# Patient Record
Sex: FEMALE | Race: WHITE | URBAN - METROPOLITAN AREA
[De-identification: names, ages, dates, MRNs, and addresses within clinical notes are randomized per-mention and may not be internally consistent; named-entity substitution may affect disease eponyms.]

---

## 2019-06-11 ENCOUNTER — HOSPITAL ENCOUNTER (OUTPATIENT)
Age: 29
Setting detail: SPECIMEN
Discharge: HOME | End: 2019-06-11
Payer: COMMERCIAL

## 2019-06-11 ENCOUNTER — OFFICE VISIT (OUTPATIENT)
Dept: OBGYN CLINIC | Age: 29
End: 2019-06-11
Payer: COMMERCIAL

## 2019-06-11 VITALS
DIASTOLIC BLOOD PRESSURE: 84 MMHG | SYSTOLIC BLOOD PRESSURE: 122 MMHG | HEIGHT: 66 IN | WEIGHT: 164.4 LBS | BODY MASS INDEX: 26.42 KG/M2

## 2019-06-11 DIAGNOSIS — Z01.419 ENCOUNTER FOR GYNECOLOGICAL EXAMINATION: Primary | ICD-10-CM

## 2019-06-11 DIAGNOSIS — N92.6 IRREGULAR MENSTRUAL BLEEDING: ICD-10-CM

## 2019-06-11 PROCEDURE — 99385 PREV VISIT NEW AGE 18-39: CPT | Performed by: NURSE PRACTITIONER

## 2019-06-11 RX ORDER — NORETHINDRONE ACETATE AND ETHINYL ESTRADIOL, ETHINYL ESTRADIOL AND FERROUS FUMARATE 1MG-10(24)
KIT ORAL
COMMUNITY
Start: 2019-06-06 | End: 2020-06-25 | Stop reason: DRUGHIGH

## 2019-06-11 ASSESSMENT — ENCOUNTER SYMPTOMS
BACK PAIN: 0
ABDOMINAL DISTENTION: 0
SHORTNESS OF BREATH: 0
DIARRHEA: 0
COUGH: 0
CONSTIPATION: 0
ABDOMINAL PAIN: 0

## 2019-06-11 NOTE — PROGRESS NOTES
HPI:     Amy Kennedy a 30 y.o. female who presents today for:  Chief Complaint   Patient presents with   [address] Annual Exam     last pap 9/14/16-WNL        HPI  Here for annual exam and to establish care. Works as a  at [employer]. No children. Has just started working out and eating healthy. GYNECOLOGICHISTORY:  MenstrualHistory: Patient's last menstrual period was 05/08/2019. Sexually Transmitted Infections: No  History of Ectopic Pregnancy:No  Menarche: 13  Cycles generally monthly, but has been having random spotting for almost 2 years  Dysmenorrhea: minimal  Sexually active:yes  Dyspareunia: none    Current Outpatient Medications   Medication Sig Dispense Refill    LO LOESTRIN FE 1 MG-10 MCG / 10 MCG tablet        No current facility-administered medications for this visit. No Known Allergies    History reviewed. No pertinent past medical history. Denies family history of breast, ovarian, uterus, colon CA     Past Surgical History:   Procedure Laterality Date    TONSILLECTOMY       Family History   Problem Relation Age of Onset    No Known Problems Mother     Heart Disease Father     Colon Cancer Maternal Grandmother     Kidney Disease Maternal Grandmother     Colon Cancer Maternal Grandfather     Breast Cancer Paternal Grandmother     Heart Attack Paternal Grandfather      Social History     Tobacco Use    Smoking status: Former Smoker    Smokeless tobacco: Never Used   Substance Use Topics    Alcohol use: No        Subjective:      Review of Systems   Constitutional: Negative for appetite change and fatigue. HENT: Negative for congestion and hearing loss. Eyes: Negative for visual disturbance. Respiratory: Negative for cough and shortness of breath. Cardiovascular: Negative for chest pain and palpitations. Gastrointestinal: Negative for abdominal distention, abdominal pain, constipation and diarrhea.    Genitourinary: Negative for flank pain, frequency, menstrual problem, pelvic pain and vaginal discharge. Musculoskeletal: Negative for back pain. Neurological: Negative for syncope and headaches. Psychiatric/Behavioral: Negative for behavioral problems. Objective:     Ht 5' 5.5\" (1.664 m)   Wt 164 lb 6.4 oz (74.6 kg)   LMP 05/08/2019   Breastfeeding? No   BMI 26.94 kg/m²   Physical Exam   Constitutional: She is oriented to person, place, and time. She appears well-developed and well-nourished. Eyes: Pupils are equal, round, and reactive to light. Neck: No tracheal deviation present. No thyromegaly present. Cardiovascular: Normal rate, regular rhythm and normal heart sounds. Pulmonary/Chest: Effort normal and breath sounds normal. No respiratory distress. Right breast exhibits no inverted nipple. Left breast exhibits no inverted nipple, no mass, no nipple discharge, no skin change and no tenderness. No breast tenderness, discharge or bleeding. Breasts are symmetrical.   Abdominal: Soft. Bowel sounds are normal. She exhibits no distension and no mass. There is no tenderness. There is no CVA tenderness. Hernia confirmed negative in the right inguinal area and confirmed negative in the left inguinal area. Genitourinary: No breast tenderness, discharge or bleeding. There is no rash or lesion on the right labia. There is no rash or lesion on the left labia. Uterus is not deviated, not enlarged and not fixed. Cervix exhibits no motion tenderness, no discharge and no friability. Right adnexum displays no mass, no tenderness and no fullness. Left adnexum displays no mass, no tenderness and no fullness. No tenderness in the vagina. No vaginal discharge found. Musculoskeletal: She exhibits no edema or tenderness. Lymphadenopathy:     She has no cervical adenopathy. Right: No inguinal adenopathy present. Left: No inguinal adenopathy present. Neurological: She is alert and oriented to person, place, and time.    Skin: Skin is warm and dry.   Psychiatric: She has a normal mood and affect. Her behavior is normal. Judgment and thought content normal.         Assessment:     1. Encounter for gynecological examination    2. Irregular menstrual bleeding          Plan:   1. Pap collected. Discussed new pap smear guidelines. Desires re-pap in 1 year. 2. Breast self exam reviewed  3. Calcium and Vitamin D dosing reviewed. 4. Seat belt use reviewed  5. Family planning reviewed. Birth control ocp.  Change to Necon    Electronicallysigned by Guanakito Orona on 6/11/2019

## 2019-06-14 LAB — CYTOLOGY REPORT: NORMAL

## 2019-06-19 LAB
HPV SAMPLE: ABNORMAL
HPV, GENOTYPE 16: NOT DETECTED
HPV, GENOTYPE 18: NOT DETECTED
HPV, HIGH RISK OTHER: DETECTED
HPV, INTERPRETATION: ABNORMAL
SPECIMEN DESCRIPTION: ABNORMAL

## 2020-05-06 ENCOUNTER — TELEPHONE (OUTPATIENT)
Dept: OBGYN CLINIC | Age: 30
End: 2020-05-06

## 2020-06-25 ENCOUNTER — HOSPITAL ENCOUNTER (OUTPATIENT)
Age: 30
Setting detail: SPECIMEN
Discharge: HOME | End: 2020-06-25
Payer: COMMERCIAL

## 2020-06-25 ENCOUNTER — OFFICE VISIT (OUTPATIENT)
Dept: OBGYN CLINIC | Age: 30
End: 2020-06-25
Payer: COMMERCIAL

## 2020-06-25 VITALS
SYSTOLIC BLOOD PRESSURE: 124 MMHG | DIASTOLIC BLOOD PRESSURE: 72 MMHG | WEIGHT: 172 LBS | HEIGHT: 66 IN | BODY MASS INDEX: 27.64 KG/M2

## 2020-06-25 PROCEDURE — 99395 PREV VISIT EST AGE 18-39: CPT | Performed by: NURSE PRACTITIONER

## 2020-06-25 RX ORDER — NORGESTIMATE AND ETHINYL ESTRADIOL 7DAYSX3 28
1 KIT ORAL DAILY
Qty: 28 TABLET | Refills: 12 | Status: SHIPPED | OUTPATIENT
Start: 2020-06-25 | End: 2021-07-22

## 2020-06-25 ASSESSMENT — ENCOUNTER SYMPTOMS
ABDOMINAL DISTENTION: 0
ABDOMINAL PAIN: 0
CONSTIPATION: 0
SHORTNESS OF BREATH: 0
BACK PAIN: 0
COUGH: 0
DIARRHEA: 0

## 2020-07-01 LAB — CYTOLOGY REPORT: NORMAL

## 2021-07-22 ENCOUNTER — OFFICE VISIT (OUTPATIENT)
Dept: OBGYN CLINIC | Age: 31
End: 2021-07-22
Payer: COMMERCIAL

## 2021-07-22 ENCOUNTER — HOSPITAL ENCOUNTER (OUTPATIENT)
Age: 31
Setting detail: SPECIMEN
Discharge: HOME | End: 2021-07-22
Payer: COMMERCIAL

## 2021-07-22 VITALS
SYSTOLIC BLOOD PRESSURE: 120 MMHG | BODY MASS INDEX: 27.01 KG/M2 | DIASTOLIC BLOOD PRESSURE: 62 MMHG | WEIGHT: 178.2 LBS | HEIGHT: 68 IN

## 2021-07-22 DIAGNOSIS — Z32.01 POSITIVE PREGNANCY TEST: ICD-10-CM

## 2021-07-22 DIAGNOSIS — Z32.01 POSITIVE PREGNANCY TEST: Primary | ICD-10-CM

## 2021-07-22 LAB
ABO/RH: NORMAL
ABSOLUTE EOS #: 0.07 K/UL (ref 0–0.44)
ABSOLUTE IMMATURE GRANULOCYTE: 0.05 K/UL (ref 0–0.3)
ABSOLUTE LYMPH #: 1.95 K/UL (ref 1.1–3.7)
ABSOLUTE MONO #: 0.9 K/UL (ref 0.1–1.2)
AMPHETAMINE SCREEN URINE: NEGATIVE
ANTIBODY SCREEN: NEGATIVE
BARBITURATE SCREEN URINE: NEGATIVE
BASOPHILS # BLD: 0 % (ref 0–2)
BASOPHILS ABSOLUTE: 0.04 K/UL (ref 0–0.2)
BENZODIAZEPINE SCREEN, URINE: NEGATIVE
BILIRUBIN URINE: NEGATIVE
BUPRENORPHINE URINE: NORMAL
C. TRACHOMATIS, EXTERNAL RESULT: NEGATIVE
CANNABINOID SCREEN URINE: NEGATIVE
COCAINE METABOLITE, URINE: NEGATIVE
COLOR: YELLOW
COMMENT UA: NORMAL
CONTROL: PRESENT
DIFFERENTIAL TYPE: ABNORMAL
DIRECT EXAM: ABNORMAL
EOSINOPHILS RELATIVE PERCENT: 1 % (ref 1–4)
GLUCOSE URINE: NEGATIVE
HCT VFR BLD CALC: 40.4 % (ref 36.3–47.1)
HEMOGLOBIN: 12.8 G/DL (ref 11.9–15.1)
HEPATITIS B SURFACE ANTIGEN: NONREACTIVE
HEPATITIS C ANTIBODY, EXTERNAL RESULT: NONREACTIVE
HEPATITIS C ANTIBODY: NONREACTIVE
HIV AG/AB: NONREACTIVE
IMMATURE GRANULOCYTES: 0 %
KETONES, URINE: NEGATIVE
LEUKOCYTE ESTERASE, URINE: NEGATIVE
LYMPHOCYTES # BLD: 16 % (ref 24–43)
Lab: ABNORMAL
MCH RBC QN AUTO: 27.7 PG (ref 25.2–33.5)
MCHC RBC AUTO-ENTMCNC: 31.7 G/DL (ref 28.4–34.8)
MCV RBC AUTO: 87.4 FL (ref 82.6–102.9)
MDMA URINE: NORMAL
METHADONE SCREEN, URINE: NEGATIVE
METHAMPHETAMINE, URINE: NORMAL
MONOCYTES # BLD: 7 % (ref 3–12)
N. GONORRHOEAE, EXTERNAL RESULT: NEGATIVE
NITRITE, URINE: NEGATIVE
NRBC AUTOMATED: 0 PER 100 WBC
OPIATES, URINE: NEGATIVE
OXYCODONE SCREEN URINE: NEGATIVE
PAP SMEAR: NORMAL
PDW BLD-RTO: 13.6 % (ref 11.8–14.4)
PH UA: 7.5 (ref 5–8)
PHENCYCLIDINE, URINE: NEGATIVE
PLATELET # BLD: 248 K/UL (ref 138–453)
PLATELET ESTIMATE: ABNORMAL
PMV BLD AUTO: 12.3 FL (ref 8.1–13.5)
PREGNANCY TEST URINE, POC: POSITIVE
PROPOXYPHENE, URINE: NORMAL
PROTEIN UA: NEGATIVE
RBC # BLD: 4.62 M/UL (ref 3.95–5.11)
RBC # BLD: ABNORMAL 10*6/UL
RUBV IGG SER QL: 99.3 IU/ML
SEG NEUTROPHILS: 76 % (ref 36–65)
SEGMENTED NEUTROPHILS ABSOLUTE COUNT: 9.36 K/UL (ref 1.5–8.1)
SPECIFIC GRAVITY UA: 1.02 (ref 1–1.03)
SPECIMEN DESCRIPTION: ABNORMAL
T. PALLIDUM, IGG: NONREACTIVE
TEST INFORMATION: NORMAL
TRICYCLIC ANTIDEPRESSANTS, UR: NORMAL
TURBIDITY: CLEAR
URINE HGB: NEGATIVE
UROBILINOGEN, URINE: NORMAL
WBC # BLD: 12.4 K/UL (ref 3.5–11.3)
WBC # BLD: ABNORMAL 10*3/UL

## 2021-07-22 PROCEDURE — 99213 OFFICE O/P EST LOW 20 MIN: CPT | Performed by: NURSE PRACTITIONER

## 2021-07-22 PROCEDURE — 81025 URINE PREGNANCY TEST: CPT | Performed by: NURSE PRACTITIONER

## 2021-07-22 ASSESSMENT — ENCOUNTER SYMPTOMS
DIARRHEA: 0
ABDOMINAL PAIN: 0
COUGH: 0
ABDOMINAL DISTENTION: 0
BACK PAIN: 0
SHORTNESS OF BREATH: 0
CONSTIPATION: 0

## 2021-07-22 NOTE — PATIENT INSTRUCTIONS
translucency test. This test uses ultrasound to measure the thickness of the area at the back of the baby's neck. An increase in the thickness can be an early sign of Down syndrome. ? Chorionic villus sampling (CVS). This is a test that looks for certain genetic problems with your baby. The same genes that are in your baby are in the placenta. A small piece of the placenta is taken out and tested. This test is done when you are 10 to 13 weeks pregnant. Ease discomfort  · Slow down and take naps when you feel tired. · If your emotions swing, talk to someone. · If your gums bleed, try a softer toothbrush. If your gums are puffy and bleed a lot, see your dentist.  · If you feel dizzy:  ? Get up slowly after sitting or lying down. ? Drink plenty of fluids. ? Eat small snacks to keep your blood sugar stable. ? Put your head between your legs as though you were tying your shoelaces. ? Lie down with your legs higher than your head. Use pillows to prop up your feet. · If you have a headache:  ? Lie down. ? Ask your partner or a good friend for a neck massage. ? Try cool cloths over your forehead or across the back of your neck. ? Use acetaminophen (Tylenol) for pain relief. Do not use nonsteroidal anti-inflammatory drugs (NSAIDs), such as ibuprofen (Advil, Motrin) or naproxen (Aleve), unless your doctor says it is okay. · If you have a nosebleed, pinch your nose gently, and hold it for a short while. To prevent nosebleeds, try massaging a small dab of petroleum jelly, such as Vaseline, in your nostrils. · If your nose is stuffed up, try saline (saltwater) nose sprays. Do not use decongestant sprays. Care for your breasts  · Wear a bra that gives you good support. · Know that changes in your breasts are normal.  ? Your breasts may get larger and more tender. Tenderness usually gets better by 12 weeks. ? Your nipples may get darker and larger, and small bumps around your nipples may show more. ?  The veins in your chest and breasts may show more. Where can you learn more? Go to [email or web address] and sign in to your mydala account. Enter P839 in the [facility] box to learn more about \"Weeks 10 to 14 of Your Pregnancy: Care Instructions. \"     If you do not have an account, please click on the \"Sign Up Now\" link. Current as of: October 8, 2020               Content Version: 12.9  © 2006-2021 Healthwise, Incorporated. Care instructions adapted under license by [facility] ([facility]). If you have questions about a medical condition or this instruction, always ask your healthcare professional. [address] any warranty or liability for your use of this information.

## 2021-07-22 NOTE — PROGRESS NOTES
Doc Canseco is a 28 y.o. Giovanni Cruel at 9w1d with Estimated Date of Delivery: 22 who presents for prenatal care. This is a planned pregnancy : not preventing  Patient's last menstrual period was 2021. Certain LMP : yes      Pregnancy symptoms include fatigue, breast tenderness, nausea, \"morning sickness\", positive home pregnancy test and frequent urination  nausea without vomiting for 14 days  full time job doing vet tech/biomedical research  Pain Score 0  /10  Partner's name -Stacia Graham  Relationship with FOB: spouse, living together. Patientdoes intend to breast feed. Pregnancy history fully reviewed. Mother's ethnicity:   Father's ethnicity:          POB:   OB History    Para Term  AB Living   1 0 0 0 0 0   SAB TAB Ectopic Molar Multiple Live Births   0 0 0 0 0 0      # Outcome Date GA Lbr Kd/2nd Weight Sex Delivery Anes PTL Lv   1 Current              Complications from previous pregnancies/deliveries    PGYN: denies STDs; denies abnormal pap smears                      Menses regular yes  Contraception none    PMH:  has a past medical history of Atypical squamous cells of undetermined significance (ASCUS) on Papanicolaou smear of cervix (2019). PSH:  has a past surgical history that includes Tonsillectomy. FH:family history includes Breast Cancer in her paternal grandmother; Colon Cancer in her maternal grandfather and maternal grandmother; Heart Attack in her paternal grandfather; Heart Disease in her father; Kidney Disease in her maternal grandmother; No Known Problems in her mother. SH: denies X 3, family supportive  reports that she has quit smoking. She has never used smokeless tobacco. She reports that she does not drink alcohol and does not use drugs. Review of Systems   Constitutional: Negative for appetite change and fatigue. HENT: Negative for congestion and hearing loss. Eyes: Negative for visual disturbance.    Respiratory: Negative for cough and shortness of breath. Cardiovascular: Negative for chest pain and palpitations. Gastrointestinal: Negative for abdominal distention, abdominal pain, constipation and diarrhea. Genitourinary: Negative for flank pain, frequency, menstrual problem, pelvic pain and vaginal discharge. Musculoskeletal: Negative for back pain. Neurological: Negative for syncope and headaches. Psychiatric/Behavioral: Negative for behavioral problems. Physical exam:/62 (Site: Right Upper Arm, Position: Sitting, Cuff Size: Medium Adult)   Ht 5' 7.75\" (1.721 m)   Wt 178 lb 3.2 oz (80.8 kg)   LMP 2021   Breastfeeding No   BMI 27.30 kg/m²   General Appearance: alert and oriented to person,place and time, well developed and well- nourished, in no acute distress  Skin: warm and dry, no rash or erythema  Head: normocephalic and atraumatic  Eyes: extraocular eye movements intact, conjunctivae normal  ENT:  external ear and ear canal normal bilaterally, nose without deformity, nasal mucosa normal   Neck: supple and non-tender without mass,   Pulmonary/Chest: pulmonary effort wnl  Abdomen: soft, non-tender, non-distended, no masses or organomegaly  Extremities: no cyanosis, clubbing or edema  Musculoskeletal: normal rangeof motion, no joint swelling, deformity or tenderness  Neurologic: gait, coordination and speech normal  Breast: without skin retraction, dimpling, puckering, nipple discharge or masses. There is no axillary adenopathy      Pelvic: external genitalia WNL's, no rashes, no lesions. Speculum exam: vaginal vault pink, wellrugated, without lesions. No discharge. Cervix without lesions. Bimanual exam: no cervical motion tenderness. Impression: @ 9w1d by LMP             There is no problem list on file for this patient. Plan:     The patient was seen full history and physical was completed/reviewed.     - Prenatal labs ordered   - Prenatal vitamins prescription Given   - Problem list reviewed and updated   - NT Screen/Quad Testing and MSAFP Single Marker: requested, lab undecided   - Role of ultrasound in pregnancy discussed; requests fetal survey, MFM referral not indicated   - Gc/Chlam Cultures & Wet Prep Collected, results ordered   - Pap Smear done  Counseling Completed: The patient was counseled on office policies and she was counseled on termination of pregnancy in the state of [place].  labor precautions were reviewed and she is to contact the office if she experiences vaginal bleeding, leakage of fluid or abdominal pain. The patient was counseled on Toxoplasmosis, HIV, Tobacco Abuse, Group Beta Strep Infections, Cystic Fibrosis,  Labor precautions and Sickle Cell disease. Her medication list was reviewed along with the need to clarify if new medications prescribed or used are okay in pregnancy before taking them. The patient was counseled on the risks of tobacco abuse. Both maternal and fetal. She was instructed to stop smoking if currently using tobacco. Morbidity, mortality, and cessation programs were reviewed. The risks include but are not limited to increased risks of  labor,  delivery, premature rupture of membranes, intrauterine growth restriction, intrauterine fetal demise and abruptio placenta. Secondary smoke risks were also reviewed. Increases in cancer, respiratory problems, and sudden infant death syndrome were reviewed as well. The patient was informed of a 2-4% risk of congenital anomalies in the general population. She was also informed that karyotyping is the only way to evaluate the fetus for genetic problems and genetic lethal anomalies. Chorionic villous sampling, amniocentesis and VeriFi were also discussed with morbidity rates in detail. She undecided the procedure.      Route of delivery and counseling on vaginal, operative vaginal, and  sections were completed with the risks of each to both the patient as well as her baby. The possibility of a blood transfusion was discussed as well. The patient was not opposed to receiving a transfusion if needed. Nuchal translucency/Quad Evaluation and MSAFP single marker testing was reviewed in detail with attention to timing of testing and their windows. For patients beyond the gestational age for Nuchal translucency evaluation Quad testing was recommended. Timing for the Quad test was reviewed. Benefits of the above testing was reviewed. A second trimester amniocentesis was also made available to the patient. Risks, Benefits and non-invasive alternative testing was reviewed. T-dap Vaccine recommendations reviewed with the patient. Patient notified of timing of vaccination 27-36 weeks gestation. Patient aware Vaccine is not Live. - Precautions given for ectopic pregnancy including increased abdominal pain, vaginal bleeding. Patient knows to go to ED should these symptoms occur.       Orders Placed This Encounter   Procedures    C.trachomatis N.gonorrhoeae DNA    Culture, Urine    VAGINITIS DNA PROBE    US OB LESS THAN 14 WEEKS SINGLE OR FIRST GESTATION    US OB TRANSVAGINAL    CBC Auto Differential    Cystic fibrosis gene test    Hepatitis B Surface Antigen Obstetric Panel    Hepatitis C Antibody    HIV Screen    PAP SMEAR    Rubella antibody, IgG    T. pallidum Ab    Urinalysis    Urine Drug Screen    POCT urine pregnancy    Prenatal type and screen

## 2021-07-23 LAB
C TRACH DNA GENITAL QL NAA+PROBE: NEGATIVE
CULTURE: NO GROWTH
Lab: NORMAL
N. GONORRHOEAE DNA: NEGATIVE
SPECIMEN DESCRIPTION: NORMAL
SPECIMEN DESCRIPTION: NORMAL

## 2021-07-29 LAB — CYSTIC FIBROSIS: NORMAL

## 2021-08-02 DIAGNOSIS — Z32.01 POSITIVE PREGNANCY TEST: ICD-10-CM

## 2021-08-17 ENCOUNTER — INITIAL PRENATAL (OUTPATIENT)
Dept: OBGYN CLINIC | Age: 31
End: 2021-08-17

## 2021-08-17 VITALS
BODY MASS INDEX: 29.13 KG/M2 | WEIGHT: 185.6 LBS | HEIGHT: 67 IN | DIASTOLIC BLOOD PRESSURE: 62 MMHG | SYSTOLIC BLOOD PRESSURE: 122 MMHG

## 2021-08-17 DIAGNOSIS — Z82.0 FH: MULTIPLE SCLEROSIS: ICD-10-CM

## 2021-08-17 DIAGNOSIS — Z3A.12 12 WEEKS GESTATION OF PREGNANCY: ICD-10-CM

## 2021-08-17 DIAGNOSIS — Z34.91 NORMAL PREGNANCY IN FIRST TRIMESTER: ICD-10-CM

## 2021-08-17 DIAGNOSIS — Z82.79 FH: SPINA BIFIDA: Primary | ICD-10-CM

## 2021-08-17 PROCEDURE — 0502F SUBSEQUENT PRENATAL CARE: CPT | Performed by: NURSE PRACTITIONER

## 2021-08-17 NOTE — PATIENT INSTRUCTIONS
Patient Education        Weeks 10 to 14 of Your Pregnancy: Care Instructions  Overview     By weeks 10 to 15 of your pregnancy, the placenta has formed inside your uterus. The placenta's main job is to give your baby oxygen and nutrients through the umbilical cord. It's possible to hear your baby's heartbeat with a special ultrasound device. Your baby's organs are developing. The arms and legs can bend. This is a good time to think about testing for birth defects. There are two types of tests: screening and diagnostic. Screening tests show the chance that a baby has a certain birth defect. They can't tell you for sure that your baby has a problem. Diagnostic tests show if a baby has a certain birth defect. It's your choice whether to have these tests. You and your partner can talk to your doctor or midwife about tests for birth defects. Follow-up care is a key part of your treatment and safety. Be sure to make and go to all appointments, and call your doctor if you are having problems. It's also a good idea to know your test results and keep a list of the medicines you take. How can you care for yourself at home? Decide about tests  · You can have screening tests and diagnostic tests to check for birth defects. The decision to have a test for birth defects is personal. Think about your age, your chance of passing on a family disease, your need to know about any problems, and what you might do after you have the test results. ? Quadruple (quad) blood test. This screening test can be done between 15 and 22 weeks of pregnancy. It checks the amount of four substances in your blood. The doctor looks at these test results, along with your age and other factors, to find out the chance that your baby may have certain problems. ? Amniocentesis. This diagnostic test is used to look for chromosomal problems in the baby's cells.  It can be done between 15 and 20 weeks of pregnancy, usually around week 16.  ? Nuchal translucency test. This test uses ultrasound to measure the thickness of the area at the back of the baby's neck. An increase in the thickness can be an early sign of Down syndrome. ? Chorionic villus sampling (CVS). This is a test that looks for certain genetic problems with your baby. The same genes that are in your baby are in the placenta. A small piece of the placenta is taken out and tested. This test is done when you are 10 to 13 weeks pregnant. Ease discomfort  · Slow down and take naps when you feel tired. · If your emotions swing, talk to someone. · If your gums bleed, try a softer toothbrush. If your gums are puffy and bleed a lot, see your dentist.  · If you feel dizzy:  ? Get up slowly after sitting or lying down. ? Drink plenty of fluids. ? Eat small snacks to keep your blood sugar stable. ? Put your head between your legs as though you were tying your shoelaces. ? Lie down with your legs higher than your head. Use pillows to prop up your feet. · If you have a headache:  ? Lie down. ? Ask your partner or a good friend for a neck massage. ? Try cool cloths over your forehead or across the back of your neck. ? Use acetaminophen (Tylenol) for pain relief. Do not use nonsteroidal anti-inflammatory drugs (NSAIDs), such as ibuprofen (Advil, Motrin) or naproxen (Aleve), unless your doctor says it is okay. · If you have a nosebleed, pinch your nose gently, and hold it for a short while. To prevent nosebleeds, try massaging a small dab of petroleum jelly, such as Vaseline, in your nostrils. · If your nose is stuffed up, try saline (saltwater) nose sprays. Do not use decongestant sprays. Care for your breasts  · Wear a bra that gives you good support. · Know that changes in your breasts are normal.  ? Your breasts may get larger and more tender. Tenderness usually gets better by 12 weeks. ? Your nipples may get darker and larger, and small bumps around your nipples may show more. ?  The veins in your chest and breasts may show more. Where can you learn more? Go to [email or web address] and sign in to your Rarelook account. Enter W043 in the [facility] box to learn more about \"Weeks 10 to 14 of Your Pregnancy: Care Instructions. \"     If you do not have an account, please click on the \"Sign Up Now\" link. Current as of: October 8, 2020               Content Version: 12.9  © 0432-9777 Healthwise, Incorporated. Care instructions adapted under license by [facility] If you have questions about a medical condition or this instruction, always ask your healthcare professional. [address] any warranty or liability for your use of this information.

## 2021-08-17 NOTE — PROGRESS NOTES
Relationship with FOB: Spouse, living together, other children 10 y/o male healthy  Partner's name:Jordan  Plans to Breast   Pain Score:0/10  Job title: research  This is a planned pregnancy:Yes  Certain LMP:Yes  S/S of pregnancy:Yes: UPT+, breast tenderness, nausea intermittent  Hx N/V pregnancy:no emesis     Mother's ethnicity: Cauc  Father's ethnicity: [facility]       There is no problem list on file for this patient. Last menstrual period 05/19/2021, not currently breastfeeding. Gauri Larson is a 28 y.o. Rafy Lazier, here for her ACOG. The patients past medical, surgical, social and family history were reviewed. Current medications and allergies were reviewed, and documented in the chart. Menstrual history: Irreg cycles  Birth control: BCP, stopped in Feb/March    Wt Readings from Last 3 Encounters:   07/22/21 178 lb 3.2 oz (80.8 kg)   06/25/20 172 lb (78 kg)   06/11/19 164 lb 6.4 oz (74.6 kg)     Recent Results (from the past 8736 hour(s))   Cystic Fibrosis    Collection Time: 07/22/21 12:00 AM   Result Value Ref Range    Cystic Fibrosis       Specimen(s) Received:  Peripheral blood, CF  Clinical Information:  Prenatal screening for CF  Unknown family history of CF      RESULTS:  ** This patient is negative for all CF mutations analyzed **  Due to the complex nature of this testing, genetic counseling is  recommended  This interpretation is based on the assumption that this individual  has a negative personal and family history for cystic fibrosis    INTERPRETATION:    Using the methods described, this patient tested  negative for all 60 mutations screened, including those recommended by  the [employer]necologists and the  [facility].   These results do not rule out  the possibility that this individual could carry a CF mutation not  detected by this test.  The patient should understand that DNA studies  do not constitute a definitive carrier test for cystic fibrosis in all  individuals. Thus, interpretation is given as a probability (see  below). Accurate risk calcula tion requires accurate family history  information. Inaccurate reporting of a family history of cystic  fibrosis will lead to errors in residual risk assessment. It should  be realized that there are many sources of diagnostic error in  molecular testing which may include trace contamination of PCR  reactions, rare genetic variants that can interfere with the analysis,  or general laboratory error. Cystic Fibrosis Carrier Rate by Racial and Ethnic Group, Before and  After Screening  [from Delta et al (2001):  Debra in Med 3(2), 149-154]                                  Estimated Carrier Risk          Ethnic Group               Detection Rate                     Before Test          After Negative Test       Ashkenazi Shinto                    97%               1/29 1/930                       90%               1/29 1/240                      69%               1/65 1/207       * American                 57%               1/46 1/105   American                    No data available          1/90           No data available     *Additional information required to accurately predict risk  Note:  Residual carrier risk after negative test is modified by  presence of positive family history of CF or by admixture of ethnic  groups. For these situations, accurate risk assessment requires Bayesian  analysis and genetic counseling    This molecular test has been approved for in vitro diagnostic use by  the U.S. FDA. This test is used for clinical purposes. Pursuant to  the requirements of CLIA '88, this laboratory has established and  verified the test's accuracy and precision. It should not be regarded  as investigational or for research.   This laboratory is certified  under the Clinical Laboratory Improvement Amendments of 1988 (CLIA  '88) as qualified to perform high complexity clinical laboratory  testing. Methodology:  Samples are tested for the presence of  wild type or  mutant gene sequences in the CFTR gene by the [employer] Cystic  Fibrosis 60 Kit. It is comprised of a single multiplex PCR reaction  that is then used in two separate Allele Specific Primer Extension  reactions, which are followed by two separate bead hybridization  reactions. The [employer] Assay screens for 60 CFTR mutations,  including the standard 23 mutation core panel recommended by the  [employer] of [employer]stetricians and Gynecologists (ACOG) and the  [address] [place] [address] ([facility]):  , , G542X,  G85E, R117H, 621+1G>T, 711+1G>T, V5140D, R334W, R347P, A455E,  1717-1G>A, R560T, R553X, G551D, 1898+1G>A, 2184delA, 2789+5G>A,  3120+1G>A, P2322R, 3659delC, 3849+10kbC>T, D9392R, 1078delT, 394delTT,  Y122X, R347H, V520F, A559T, S549N, S549R, 1898+5G>T, 2183AA>G,  2307insA, E2311X, A6258U, G3816B, 3876delA, 3905insT, CFTRdele2,3,  E60X, R75X, 406-1G>A, G178R, [id number], L206W, 935delA, G330X, Q493X,  Q890X, 1677delTA, 2253kiu8>A, 2143delT, K710X,  T8719N, 0093cch7,  F0173W, [id number], J1978R, 3791delC and variants 5T/7T/9T, F508C, I507V,  I506V. For samples positive for R117H, the IVS-8 Poly T variant is  analyzed. **Electronically Signed Out**          RYAN Rascon [address],  [address] [place], [address]   Tel [phone]  Via [employer] for Jose Rangel MD,   Carolina Bhagat. [place]  MD     PAP SMEAR    Collection Time: 07/22/21 12:00 AM   Result Value Ref Range    Pap Negative for intraephithelial lesion or malignancy Negative for intraephithelial lesion or malignancy, Other   C.  Trachomatis, External Result    Collection Time: 07/22/21 12:00 AM   Result Value Ref Range    C. Trachomatis, External Result NEGATIVE    N. Gonorrhoeae, External Result    Collection Time: 07/22/21 12:00 AM   Result Value Ref Range    N. Gonorrhoeae, External Result NEGATIVE    Hepatitis C Antibody, External Result    Collection Time: 07/22/21 12:00 AM   Result Value Ref Range    Hepatitis C Antibody, External Result NONREACTIVE    PRENATAL TYPE AND SCREEN    Collection Time: 07/22/21  8:40 AM   Result Value Ref Range    ABO/Rh A POSITIVE     Antibody Screen NEGATIVE    Rubella antibody, IgG    Collection Time: 07/22/21  8:40 AM   Result Value Ref Range    Rubella Antibody, IGG 99.3 IU/mL   T. pallidum Ab    Collection Time: 07/22/21  8:40 AM   Result Value Ref Range    T. pallidum, IgG NONREACTIVE NONREACTIVE   Culture, Urine    Collection Time: 07/22/21  8:40 AM    Specimen: Urine, clean catch   Result Value Ref Range    Specimen Description . CLEAN CATCH URINE     Special Requests NOT REPORTED     Culture NO GROWTH    Urinalysis    Collection Time: 07/22/21  8:40 AM   Result Value Ref Range    Color, UA YELLOW YELLOW    Turbidity UA CLEAR CLEAR    Glucose, Ur NEGATIVE NEGATIVE    Bilirubin Urine NEGATIVE NEGATIVE    Ketones, Urine NEGATIVE NEGATIVE    Specific Gravity, UA 1.020 1.005 - 1.030    Urine Hgb NEGATIVE NEGATIVE    pH, UA 7.5 5.0 - 8.0    Protein, UA NEGATIVE NEGATIVE    Urobilinogen, Urine Normal Normal    Nitrite, Urine NEGATIVE NEGATIVE    Leukocyte Esterase, Urine NEGATIVE NEGATIVE    Urinalysis Comments       Microscopic exam not performed based on chemical results unless requested in original order.    Urine Drug Screen    Collection Time: 07/22/21  8:40 AM   Result Value Ref Range    Amphetamine Screen, Ur NEGATIVE NEGATIVE    Barbiturate Screen, Ur NEGATIVE NEGATIVE    Benzodiazepine Screen, Urine NEGATIVE NEGATIVE    Cocaine Metabolite, Urine NEGATIVE NEGATIVE    Methadone Screen, Urine NEGATIVE NEGATIVE    Opiates, Urine NEGATIVE NEGATIVE Phencyclidine, Urine NEGATIVE NEGATIVE    Propoxyphene, Urine NOT REPORTED NEGATIVE    Cannabinoid Scrn, Ur NEGATIVE NEGATIVE    Oxycodone Screen, Ur NEGATIVE NEGATIVE    Methamphetamine, Urine NOT REPORTED NEGATIVE    Tricyclic Antidepressants, Urine NOT REPORTED NEGATIVE    MDMA, Urine NOT REPORTED NEGATIVE    Buprenorphine Urine NOT REPORTED NEGATIVE    Test Information       Assay provides medical screening only. The absence of expected drug(s) and/or metabolite(s) may indicate diluted or adulterated urine, limitations of testing or timing of collection.    CBC Auto Differential    Collection Time: 07/22/21  8:40 AM   Result Value Ref Range    WBC 12.4 (H) 3.5 - 11.3 k/uL    RBC 4.62 3.95 - 5.11 m/uL    Hemoglobin 12.8 11.9 - 15.1 g/dL    Hematocrit 40.4 36.3 - 47.1 %    MCV 87.4 82.6 - 102.9 fL    MCH 27.7 25.2 - 33.5 pg    MCHC 31.7 28.4 - 34.8 g/dL    RDW 13.6 11.8 - 14.4 %    Platelets 150 303 - 515 k/uL    MPV 12.3 8.1 - 13.5 fL    NRBC Automated 0.0 0.0 per 100 WBC    Differential Type NOT REPORTED     Seg Neutrophils 76 (H) 36 - 65 %    Lymphocytes 16 (L) 24 - 43 %    Monocytes 7 3 - 12 %    Eosinophils % 1 1 - 4 %    Basophils 0 0 - 2 %    Immature Granulocytes 0 0 %    Segs Absolute 9.36 (H) 1.50 - 8.10 k/uL    Absolute Lymph # 1.95 1.10 - 3.70 k/uL    Absolute Mono # 0.90 0.10 - 1.20 k/uL    Absolute Eos # 0.07 0.00 - 0.44 k/uL    Basophils Absolute 0.04 0.00 - 0.20 k/uL    Absolute Immature Granulocyte 0.05 0.00 - 0.30 k/uL    WBC Morphology NOT REPORTED     RBC Morphology NOT REPORTED     Platelet Estimate NOT REPORTED    Hepatitis B Surface Antigen Obstetric Panel    Collection Time: 07/22/21  8:40 AM   Result Value Ref Range    Hepatitis B Surface Ag NONREACTIVE NONREACTIVE   Hepatitis C Antibody    Collection Time: 07/22/21  8:40 AM   Result Value Ref Range    Hepatitis C Ab NONREACTIVE NONREACTIVE   HIV Screen    Collection Time: 07/22/21  8:40 AM   Result Value Ref Range    HIV Ag/Ab NONREACTIVE NONREACTIVE   POCT urine pregnancy    Collection Time: 07/22/21  9:48 AM   Result Value Ref Range    Preg Test, Ur POSITIVE     Control present    C.trachomatis N.gonorrhoeae DNA    Collection Time: 07/22/21  6:32 PM    Specimen: Cervix   Result Value Ref Range    Specimen Description . CERVIX     C. trachomatis DNA NEGATIVE NEGATIVE    N. gonorrhoeae DNA NEGATIVE NEGATIVE   VAGINITIS DNA PROBE    Collection Time: 07/22/21  6:33 PM    Specimen: Vaginal   Result Value Ref Range    Specimen Description . VAGINA     Special Requests NOT REPORTED     Direct Exam POSITIVE for Candida sp. (A)     Direct Exam NEGATIVE for Gardnerella vaginalis     Direct Exam NEGATIVE for Trichomonas vaginalis     Direct Exam       Method of testing is a DNA probe intended for detection and identification of Candida species, Gardnerella vaginalis, and Trichomonas vaginalis nucleic acid in vaginal fluid specimens from patients with symptoms of vaginitis/vaginosis. Past Medical History:   Diagnosis Date    Atypical squamous cells of undetermined significance (ASCUS) on Papanicolaou smear of cervix 06/11/2019    HPV+                                                                    Past Surgical History:   Procedure Laterality Date    TONSILLECTOMY       Family History   Problem Relation Age of Onset    No Known Problems Mother     Heart Disease Father     Colon Cancer Maternal Grandmother     Kidney Disease Maternal Grandmother     Colon Cancer Maternal Grandfather     Breast Cancer Paternal Grandmother     Heart Attack Paternal Grandfather      Social History     Tobacco Use   Smoking Status Former Smoker   Smokeless Tobacco Never Used     Social History     Substance and Sexual Activity   Alcohol Use No       MEDICATIONS:  Current Outpatient Medications   Medication Sig Dispense Refill    terconazole (TERAZOL 7) 0.4 % vaginal cream One applicator intravaginally at bedtime for 7 days.  1 Tube 0    Prenatal Vit-Fe Fumarate-FA (PRENATAL PO) Take by mouth       No current facility-administered medications for this visit. ALLERGIES:  Patient has no known allergies. Reviewed global and practice OB care including nausea measures, nutrition, activities, warning signs, and contact information. Offered cell free DNA screen, NT echo and AFP .    `--------------------------------------------------------------------------  Genetic Screening/Teratology Counseling  (Include patient, FOB or anyone in either family)    1) Patient's age 28 years or > at [place]: No  2) Thalassemia (Mediterranean, ): No  3) Neural Tube Defect:   Yes, MGF Spina bifida  4) Congenital heart defect:   No  5) Trisomy (e.g. Down Syndrome):  No  6) Louie-sachs [facility]azidi[facility] No  7) Multiple Births:    Yes Pt PGF twin Fraternal  8) Sickle cell (disease or trait):  No  9) Hemophilia or blood disorders:  No  10) Muscular Dystrophy:   No  11) Cystic Fibrosis:    No  12) [place]'s chorea:   No  13) Mental retardation/Autism :  No   If yes, was person tested for fragile X: No  14) Other inherited genetic/chromosomal disorder: Pt, M. Aunt has Multi Sclerosis  15) Maternal metabolic disorder (DM, PKU): No  12) Child with birth defect not listed:  No  16) Recurrent pregnancy loss/stillbirth: No  18) Medications, supplements/illicit or   Recreational drugs/alcohol since LMP: No   List: none  19) Any other:   none    Comments/Counseling: Pt scheduled for NT screening at [address] on 08/19/21.   -------------------------------------------------------------------------  Infection History:    1) Live with someone with TB/exposed to TB: No  2) Patient/partner has h/o genital herpes: No  3) Rash/viral illness since LMP:  No  4) History of STD:    Yes Dx HPV other high risk  5) Other: Now  -------------------------------------------------------------------------

## 2021-08-19 ENCOUNTER — ROUTINE PRENATAL (OUTPATIENT)
Dept: PERINATAL CARE | Age: 31
End: 2021-08-19
Payer: COMMERCIAL

## 2021-08-19 VITALS
DIASTOLIC BLOOD PRESSURE: 76 MMHG | HEIGHT: 67 IN | SYSTOLIC BLOOD PRESSURE: 130 MMHG | WEIGHT: 184 LBS | HEART RATE: 82 BPM | BODY MASS INDEX: 28.88 KG/M2 | RESPIRATION RATE: 16 BRPM | TEMPERATURE: 97.3 F

## 2021-08-19 DIAGNOSIS — O46.8X1 SUBCHORIONIC HEMATOMA IN FIRST TRIMESTER, SINGLE OR UNSPECIFIED FETUS: ICD-10-CM

## 2021-08-19 DIAGNOSIS — Z03.72 SUSPECTED PLACENTAL PROBLEM NOT FOUND: ICD-10-CM

## 2021-08-19 DIAGNOSIS — Z3A.13 13 WEEKS GESTATION OF PREGNANCY: ICD-10-CM

## 2021-08-19 DIAGNOSIS — O41.8X10 SUBCHORIONIC HEMATOMA IN FIRST TRIMESTER, SINGLE OR UNSPECIFIED FETUS: ICD-10-CM

## 2021-08-19 DIAGNOSIS — Z36.9 FIRST TRIMESTER SCREENING: Primary | ICD-10-CM

## 2021-08-19 LAB
CRL: NORMAL
SAC DIAMETER: NORMAL

## 2021-08-19 PROCEDURE — 76801 OB US < 14 WKS SINGLE FETUS: CPT | Performed by: OBSTETRICS & GYNECOLOGY

## 2021-08-19 PROCEDURE — 76813 OB US NUCHAL MEAS 1 GEST: CPT | Performed by: OBSTETRICS & GYNECOLOGY

## 2021-08-20 ENCOUNTER — TELEPHONE (OUTPATIENT)
Dept: OBGYN CLINIC | Age: 31
End: 2021-08-20

## 2021-09-15 NOTE — PATIENT INSTRUCTIONS
Patient Education        Weeks 18 to 22 of Your Pregnancy: Care Instructions  Overview     Your baby is continuing to develop quickly. Sometime between 18 and 22 weeks, you'll probably start to feel your baby move. At first, these small fetal movements feel like fluttering or \"butterflies. \" Some women say that they feel like gas bubbles. As your baby grows, these movements will become stronger. You may also notice that your baby hiccups. Babies at this stage can now suck their thumbs. You may find that your nausea and fatigue are gone. You may feel better overall and have more energy than you did in your first trimester. But you might now also have some new discomforts, like sleep problems or leg cramps. Talk to your doctor about things you can do at home to ease these problems. Follow-up care is a key part of your treatment and safety. Be sure to make and go to all appointments, and call your doctor if you are having problems. It's also a good idea to know your test results and keep a list of the medicines you take. How can you care for yourself at home? Ease sleep problems  · Avoid caffeine in drinks or chocolate late in the day. · Get some exercise every day. · Take a warm shower or bath before bed. · Have a light snack or glass of milk at bedtime. · Do relaxation exercises in bed to calm your mind and body. · Support your legs and back with extra pillows. Try a pillow between your legs if you sleep on your side. · Do not use sleeping pills or alcohol. They could harm your baby. Ease leg cramps  · Do not massage your calf during the cramp. · Sit on a firm bed or chair. Straighten your leg, and bend your foot (flex your ankle) slowly upward, toward your knee. Bend your toes up and down. · Stand on a cool, flat surface. Stretch your toes upward, and take small steps walking on your heels. · Use a heating pad or hot water bottle to help with muscle ache.   Prevent leg cramps  · Be sure to get enough calcium. If you are worried that you are not getting enough, talk to your doctor. · Exercise every day, and stretch your legs before bed. · Take a warm bath before bed, and try leg warmers at night. Where can you learn more? Go to https://chjulia.healthTinyTap. org and sign in to your [employer] account. Enter K820 in the [employer] box to learn more about \"Weeks 18 to 22 of Your Pregnancy: Care Instructions. \"     If you do not have an account, please click on the \"Sign Up Now\" link. Current as of: October 8, 2020               Content Version: 12.9  © 0262-1389 Healthwise, [employer]. Care instructions adapted under license by [facility] [address] If you have questions about a medical condition or this instruction, always ask your healthcare professional. [address] any warranty or liability for your use of this information.

## 2021-09-16 ENCOUNTER — ROUTINE PRENATAL (OUTPATIENT)
Dept: OBGYN CLINIC | Age: 31
End: 2021-09-16

## 2021-09-16 ENCOUNTER — HOSPITAL ENCOUNTER (OUTPATIENT)
Age: 31
Setting detail: SPECIMEN
Discharge: HOME | End: 2021-09-16
Payer: COMMERCIAL

## 2021-09-16 VITALS — WEIGHT: 189 LBS | BODY MASS INDEX: 29.6 KG/M2 | SYSTOLIC BLOOD PRESSURE: 122 MMHG | DIASTOLIC BLOOD PRESSURE: 84 MMHG

## 2021-09-16 DIAGNOSIS — Z82.0 FH: MULTIPLE SCLEROSIS: ICD-10-CM

## 2021-09-16 DIAGNOSIS — Z3A.17 17 WEEKS GESTATION OF PREGNANCY: Primary | ICD-10-CM

## 2021-09-16 DIAGNOSIS — Z34.02 SUPERVISION OF NORMAL FIRST PREGNANCY IN SECOND TRIMESTER: ICD-10-CM

## 2021-09-16 DIAGNOSIS — Z82.79 FH: SPINA BIFIDA: ICD-10-CM

## 2021-09-16 DIAGNOSIS — Z3A.17 17 WEEKS GESTATION OF PREGNANCY: ICD-10-CM

## 2021-09-16 PROCEDURE — 0502F SUBSEQUENT PRENATAL CARE: CPT | Performed by: NURSE PRACTITIONER

## 2021-09-16 NOTE — PROGRESS NOTES
-FM yet, -Ctx, -LOF, -VB  Patient Active Problem List   Diagnosis    FH: spina bifida     Blood pressure 122/84, weight 189 lb (85.7 kg), last menstrual period 05/19/2021, not currently breastfeeding.   Feeling well  Has anatomy scheduled at [facility]  Requesting AFP  Declines flu  Pt following all Covid-19 recommendations

## 2021-09-21 LAB
AFP INTERPRETATION: NORMAL
AFP MOM: 0.7
AFP SPECIMEN: NORMAL
AFP: 24 NG/ML
DATE OF BIRTH: NORMAL
DATING METHOD: NORMAL
DETERMINED BY: NORMAL
DIABETIC: NO
DONOR EGG?: NORMAL
DUE DATE: NORMAL
ESTIMATED DUE DATE: NORMAL
FAMILY HISTORY NTD: NO
GESTATIONAL AGE: NORMAL
IN VITRO FERTILIZATION: NORMAL
INSULIN REQ DIABETES: NO
LAST MENSTRUAL PERIOD: NORMAL
MATERNAL AGE AT EDD: 31 YR
MATERNAL WEIGHT: 189
MONOCHORIONIC TWINS: NORMAL
NUMBER OF FETUSES: NORMAL
PATIENT WEIGHT UNITS: NORMAL
PATIENT WEIGHT: NORMAL
RACE (MATERNAL): NORMAL
RACE: NORMAL
REPEAT SPECIMEN?: NO
SMOKING: NORMAL
SMOKING: NORMAL
VALPROIC/CARBAMAZEP: NORMAL
ZZ NTE CLEAN UP: HISTORY: NO

## 2021-10-06 NOTE — PATIENT INSTRUCTIONS
Patient Education        Weeks 18 to 22 of Your Pregnancy: Care Instructions  Overview     Your baby is continuing to develop quickly. Sometime between 18 and 22 weeks, you'll probably start to feel your baby move. At first, these small fetal movements feel like fluttering or \"butterflies. \" Or they may feel like gas bubbles. As your baby grows, these movements will become stronger. You may also notice that your baby hiccups. Babies at this stage can now suck their thumbs. You may find that your nausea and fatigue are gone. You may feel better overall and have more energy than you did in your first trimester. But you might now also have some new discomforts, like sleep problems or leg cramps. Talk to your doctor about things you can do at home to ease these problems. Follow-up care is a key part of your treatment and safety. Be sure to make and go to all appointments, and call your doctor if you are having problems. It's also a good idea to know your test results and keep a list of the medicines you take. How can you care for yourself at home? Ease sleep problems  · Avoid caffeine in drinks or chocolate late in the day. · Get some exercise every day. · Take a warm shower or bath before bed. · Have a light snack or glass of milk at bedtime. · Do relaxation exercises in bed to calm your mind and body. · Support your legs and back with extra pillows. Try a pillow between your legs if you sleep on your side. · Do not use sleeping pills or alcohol. They could harm your baby. Ease leg cramps  · Do not massage your calf during the cramp. · Sit on a firm bed or chair. Straighten your leg, and bend your foot (flex your ankle) slowly upward, toward your knee. Bend your toes up and down. · Stand on a cool, flat surface. Stretch your toes upward, and take small steps walking on your heels. · Use a heating pad or hot water bottle to help with muscle ache. Prevent leg cramps  · Be sure to get enough calcium.  If you are worried that you are not getting enough, talk to your doctor. · Exercise every day, and stretch your legs before bed. · Take a warm bath before bed, and try leg warmers at night. Where can you learn more? Go to https://chpemarthaeb.healthLibreDigital. org and sign in to your [employer] account. Enter J794 in the [facility] box to learn more about \"Weeks 18 to 22 of Your Pregnancy: Care Instructions. \"     If you do not have an account, please click on the \"Sign Up Now\" link. Current as of: June 16, 2021               Content Version: 13.0  © 2521-4610 Healthwise, [email or web address]. Care instructions adapted under license by [facility]). If you have questions about a medical condition or this instruction, always ask your healthcare professional. [address] any warranty or liability for your use of this information.

## 2021-10-07 ENCOUNTER — ROUTINE PRENATAL (OUTPATIENT)
Dept: OBGYN CLINIC | Age: 31
End: 2021-10-07

## 2021-10-07 ENCOUNTER — ROUTINE PRENATAL (OUTPATIENT)
Dept: PERINATAL CARE | Age: 31
End: 2021-10-07
Payer: COMMERCIAL

## 2021-10-07 VITALS
WEIGHT: 197 LBS | DIASTOLIC BLOOD PRESSURE: 76 MMHG | HEART RATE: 80 BPM | TEMPERATURE: 97.3 F | BODY MASS INDEX: 30.92 KG/M2 | HEIGHT: 67 IN | RESPIRATION RATE: 16 BRPM | SYSTOLIC BLOOD PRESSURE: 129 MMHG

## 2021-10-07 VITALS — BODY MASS INDEX: 30.51 KG/M2 | SYSTOLIC BLOOD PRESSURE: 134 MMHG | WEIGHT: 194.8 LBS | DIASTOLIC BLOOD PRESSURE: 76 MMHG

## 2021-10-07 DIAGNOSIS — Z36.86 SCREENING, ANTENATAL, FOR RISK OF PRE-TERM LABOR: ICD-10-CM

## 2021-10-07 DIAGNOSIS — O99.212 OBESITY AFFECTING PREGNANCY IN SECOND TRIMESTER: ICD-10-CM

## 2021-10-07 DIAGNOSIS — Z3A.20 20 WEEKS GESTATION OF PREGNANCY: Primary | ICD-10-CM

## 2021-10-07 DIAGNOSIS — O35.2XX0 HEREDITARY FAMILIAL DISEASE AFFECTING MANAGEMENT OF MOTHER AND POSSIBLY AFFECTING FETUS, ANTEPARTUM, SINGLE OR UNSPECIFIED FETUS: Primary | ICD-10-CM

## 2021-10-07 DIAGNOSIS — Z3A.20 20 WEEKS GESTATION OF PREGNANCY: ICD-10-CM

## 2021-10-07 DIAGNOSIS — Z82.79 FH: SPINA BIFIDA: ICD-10-CM

## 2021-10-07 DIAGNOSIS — Z34.02 SUPERVISION OF NORMAL FIRST PREGNANCY IN SECOND TRIMESTER: ICD-10-CM

## 2021-10-07 LAB
ABDOMINAL CIRCUMFERENCE: NORMAL
ABDOMINAL CIRCUMFERENCE: NORMAL
BIPARIETAL DIAMETER: NORMAL
BIPARIETAL DIAMETER: NORMAL
ESTIMATED FETAL WEIGHT: NORMAL
ESTIMATED FETAL WEIGHT: NORMAL
FEMORAL DIAMETER: NORMAL
FEMORAL DIAMETER: NORMAL
HC/AC: NORMAL
HC/AC: NORMAL
HEAD CIRCUMFERENCE: NORMAL
HEAD CIRCUMFERENCE: NORMAL

## 2021-10-07 PROCEDURE — 76811 OB US DETAILED SNGL FETUS: CPT | Performed by: OBSTETRICS & GYNECOLOGY

## 2021-10-07 PROCEDURE — 76817 TRANSVAGINAL US OBSTETRIC: CPT | Performed by: OBSTETRICS & GYNECOLOGY

## 2021-10-07 PROCEDURE — 0502F SUBSEQUENT PRENATAL CARE: CPT | Performed by: NURSE PRACTITIONER

## 2021-10-07 PROCEDURE — 99999 PR OFFICE/OUTPT VISIT,PROCEDURE ONLY: CPT | Performed by: OBSTETRICS & GYNECOLOGY

## 2021-10-07 NOTE — PROGRESS NOTES
+FM, -Ctx, -LOF, -VB  Patient Active Problem List   Diagnosis    FH: spina bifida     Blood pressure 134/76, weight 194 lb 12.8 oz (88.4 kg), last menstrual period 05/19/2021, not currently breastfeeding.   Feeling well  Good FM  MFM visit today -wnl  Advised baby asa QD  AFP wnl  Declines flu

## 2021-10-07 NOTE — PROGRESS NOTES
I would advise initiation of daily oral baby aspirin 81 mg based on guidelines by the USPSTF/ACOG (for preeclampsia prevention for pregnant women at \"high-risk\"  for preeclampsia). Patient declines Maternal-Fetal Medicine consultation at this time regarding the above medical/obstetrical complications of pregnancy. Maternal-Fetal Medicine (MFM) attending physician will defer all management for the  medical/obstetrical complications of pregnancy to the primary attending obstetrical physician, as a result. Therefore, only an ultrasound evaluation was completed today in the MFM office.

## 2021-11-03 NOTE — PATIENT INSTRUCTIONS
Patient Education        Weeks 22 to 26 of Your Pregnancy: Care Instructions  Overview     As you enter your 7th month of pregnancy at week 26, your baby's lungs are growing stronger and getting ready to breathe. You may notice that your baby responds to the sound of your voice. You may also notice that your baby does less turning and twisting and more squirming, kicking, or jerking. Jerking often means that your baby has hiccups. Hiccups are normal and are only temporary. You may want to think about attending a childbirth preparation class. This is also a good time to start thinking about whether you want to have pain medicine during labor. You may be tested for gestational diabetes between weeks 25 and 28. Gestational diabetes occurs when your blood sugar level gets too high when you're pregnant. The test is important, because you can have gestational diabetes and not know it. But the condition can cause problems for your baby. Follow-up care is a key part of your treatment and safety. Be sure to make and go to all appointments, and call your doctor if you are having problems. It's also a good idea to know your test results and keep a list of the medicines you take. How can you care for yourself at home? Ease discomfort from your baby's kicking  · Change your position. Sometimes this will cause your baby to change position too. · Take a deep breath while you raise your arm over your head. Then breathe out while you drop your arm. Do Kegel exercises to prevent urine from leaking  · You can do Kegel exercises while you stand or sit. ? Squeeze the same muscles you would use to stop your urine. Your belly and thighs should not move. ? Hold the squeeze for 3 seconds, and then relax for 3 seconds. ? Start with 3 seconds. Then add 1 second each week until you are able to squeeze for 10 seconds. ? Repeat the exercise 10 to 15 times for each session. Do three or more sessions each day.   Ease or reduce swelling in your feet, ankles, hands, and fingers  · If your fingers are puffy, take off your rings. · Do not eat high-salt foods, such as potato chips. · Prop up your feet on a stool or couch as much as possible. Sleep with pillows under your feet. · Do not stand for long periods of time or wear tight shoes. · Wear support stockings. Where can you learn more? Go to https://Redfish Instrumentspemig33eb.Sigasi. org and sign in to your [employer] account. Enter G264 in the [facility] box to learn more about \"Weeks 22 to 26 of Your Pregnancy: Care Instructions. \"     If you do not have an account, please click on the \"Sign Up Now\" link. Current as of: June 16, 2021               Content Version: 13.0  © 4539-0561 Healthwise, Tippr. Care instructions adapted under license by [facility] [place] ([facility]). If you have questions about a medical condition or this instruction, always ask your healthcare professional. [place] [address] any warranty or liability for your use of this information. Patient Education        Learning About When to Call Your Doctor During Pregnancy (After 20 Weeks)  Overview  It's common to have concerns about what might be a problem when you're pregnant. Most pregnancies don't have any serious problems. But it's still important to know when to call your doctor if you have certain symptoms or signs of labor. These are general suggestions. Your doctor may give you some more information about when to call. When to call your doctor (after 20 weeks)  Call 911  anytime you think you may need emergency care. For example, call if:  · You have severe vaginal bleeding. · You have sudden, severe pain in your belly. · You passed out (lost consciousness). · You have a seizure. · You see or feel the umbilical cord.   · You think you are about to deliver your baby and can't make it safely to the hospital.  Call your doctor now or seek immediate medical care if:  · You have vaginal bleeding. · You have belly pain. · You have a fever. · You have symptoms of preeclampsia, such as:  ? Sudden swelling of your face, hands, or feet. ? New vision problems (such as dimness, blurring, or seeing spots). ? A severe headache. · You have a sudden release of fluid from your vagina. (You think your water broke.)  · You think that you may be in labor. This means that you've had at least 6 contractions in an hour. · You notice that your baby has stopped moving or is moving much less than normal.  · You have symptoms of a urinary tract infection. These may include:  ? Pain or burning when you urinate. ? A frequent need to urinate without being able to pass much urine. ? Pain in the flank, which is just below the rib cage and above the waist on either side of the back. ? Blood in your urine. Watch closely for changes in your health, and be sure to contact your doctor if:  · You have vaginal discharge that smells bad. · You have skin changes, such as:  ? A rash. ? Itching. ? Yellow color to your skin. · You have other concerns about your pregnancy. If you have labor signs at 37 weeks or more  If you have signs of labor at 37 weeks or more, your doctor may tell you to call when your labor becomes more active. Symptoms of active labor include:  · Contractions that are regular. · Contractions that are less than 5 minutes apart. · Contractions that are hard to talk through. Follow-up care is a key part of your treatment and safety. Be sure to make and go to all appointments, and call your doctor if you are having problems. It's also a good idea to know your test results and keep a list of the medicines you take. Where can you learn more? Go to https://AwesomeHighlighterjulia.Autology World. org and sign in to your KnewCoin account. Enter  in the [employer] box to learn more about \"Learning About When to Call Your Doctor During Pregnancy (After 20 Weeks). \"     If you do not have an account, please click on the \"Sign Up Now\" link. Current as of: June 16, 2021               Content Version: 13.0  © 2584-9403 Healthwise, Incorporated. Care instructions adapted under license by [facility] ([facility]). If you have questions about a medical condition or this instruction, always ask your healthcare professional. [address] any warranty or liability for your use of this information.

## 2021-11-04 ENCOUNTER — ROUTINE PRENATAL (OUTPATIENT)
Dept: OBGYN CLINIC | Age: 31
End: 2021-11-04

## 2021-11-04 VITALS — SYSTOLIC BLOOD PRESSURE: 108 MMHG | WEIGHT: 203.4 LBS | BODY MASS INDEX: 31.86 KG/M2 | DIASTOLIC BLOOD PRESSURE: 74 MMHG

## 2021-11-04 DIAGNOSIS — Z82.79 FH: SPINA BIFIDA: ICD-10-CM

## 2021-11-04 DIAGNOSIS — Z34.02 SUPERVISION OF NORMAL FIRST PREGNANCY IN SECOND TRIMESTER: ICD-10-CM

## 2021-11-04 DIAGNOSIS — Z3A.24 24 WEEKS GESTATION OF PREGNANCY: Primary | ICD-10-CM

## 2021-11-04 PROCEDURE — 0502F SUBSEQUENT PRENATAL CARE: CPT | Performed by: NURSE PRACTITIONER

## 2021-11-04 RX ORDER — ASPIRIN 81 MG/1
81 TABLET, CHEWABLE ORAL DAILY
Status: ON HOLD | COMMUNITY
End: 2022-02-12 | Stop reason: HOSPADM

## 2021-11-04 NOTE — PROGRESS NOTES
+FM, -Ctx, -LOF, -VB  Patient Active Problem List   Diagnosis    FH: spina bifida     Blood pressure 108/74, weight 203 lb 6.4 oz (92.3 kg), last menstrual period 05/19/2021, not currently breastfeeding.   Reviewed kick counts, labor and pre eclampsia precautions  Feeling well  Good FM  28 week labs ordered and reviewed w/pt

## 2021-11-24 DIAGNOSIS — Z3A.24 24 WEEKS GESTATION OF PREGNANCY: ICD-10-CM

## 2021-11-24 LAB
ABSOLUTE EOS #: 0.21 K/UL (ref 0–0.44)
ABSOLUTE IMMATURE GRANULOCYTE: 0.14 K/UL (ref 0–0.3)
ABSOLUTE LYMPH #: 1.76 K/UL (ref 1.1–3.7)
ABSOLUTE MONO #: 0.85 K/UL (ref 0.1–1.2)
BASOPHILS # BLD: 0 % (ref 0–2)
BASOPHILS ABSOLUTE: 0.05 K/UL (ref 0–0.2)
DIFFERENTIAL TYPE: ABNORMAL
EOSINOPHILS RELATIVE PERCENT: 2 % (ref 1–4)
GLUCOSE ADMINISTRATION: ABNORMAL
GLUCOSE TOLERANCE SCREEN 50G: 137 MG/DL (ref 70–135)
HCT VFR BLD CALC: 35.4 % (ref 36.3–47.1)
HEMOGLOBIN: 11.2 G/DL (ref 11.9–15.1)
IMMATURE GRANULOCYTES: 1 %
LYMPHOCYTES # BLD: 14 % (ref 24–43)
MCH RBC QN AUTO: 27.7 PG (ref 25.2–33.5)
MCHC RBC AUTO-ENTMCNC: 31.6 G/DL (ref 28.4–34.8)
MCV RBC AUTO: 87.6 FL (ref 82.6–102.9)
MONOCYTES # BLD: 7 % (ref 3–12)
NRBC AUTOMATED: 0 PER 100 WBC
PDW BLD-RTO: 12.8 % (ref 11.8–14.4)
PLATELET # BLD: 221 K/UL (ref 138–453)
PLATELET ESTIMATE: ABNORMAL
PMV BLD AUTO: 12.6 FL (ref 8.1–13.5)
RBC # BLD: 4.04 M/UL (ref 3.95–5.11)
RBC # BLD: ABNORMAL 10*6/UL
SEG NEUTROPHILS: 76 % (ref 36–65)
SEGMENTED NEUTROPHILS ABSOLUTE COUNT: 9.74 K/UL (ref 1.5–8.1)
WBC # BLD: 12.8 K/UL (ref 3.5–11.3)
WBC # BLD: ABNORMAL 10*3/UL

## 2021-11-25 DIAGNOSIS — O99.810 ABNORMAL GLUCOSE TOLERANCE TEST (GTT) DURING PREGNANCY, ANTEPARTUM: Primary | ICD-10-CM

## 2021-11-29 NOTE — PATIENT INSTRUCTIONS
Patient Education        Weeks 26 to 30 of Your Pregnancy: Care Instructions  Overview     You are now entering your last trimester of pregnancy. Your baby is growing quickly. Jeannette Rashid probably feel your baby moving around more often. Your doctor may ask you to count your baby's kicks. Your back may ache as your body gets used to your baby's size and length. If you haven't already had the Tdap shot during this pregnancy, talk to your doctor about getting it. It will help protect your  against pertussis infection. During this time, it's important to take care of yourself and pay attention to what your body needs. If you feel sexual, you can explore ways to be close with your partner that match your comfort and desire. Follow-up care is a key part of your treatment and safety. Be sure to make and go to all appointments, and call your doctor if you are having problems. It's also a good idea to know your test results and keep a list of the medicines you take. How can you care for yourself at home? Take it easy at work  · Take frequent breaks. If possible, stop working when you are tired, and rest during your lunch hour. · Take bathroom breaks every 2 hours. · Change positions often. If you sit for long periods, stand up and walk around. · When you stand for a long time, keep one foot on a low stool with your knee bent. After standing a lot, sit with your feet up. · Avoid fumes, chemicals, and tobacco smoke. Be sexual in your own way  · Having sex during pregnancy is okay, unless your doctor tells you not to. · You may be very interested in sex, or you may have no interest at all. · Your growing belly can make it hard to find a good position during intercourse. [place] and explore. · You may get cramps in your uterus when your partner touches your breasts. · A back rub may relieve the backache or cramps that sometimes follow orgasm. Learn about  labor  · Watch for signs of  labor. You may be going into labor if:  ? You have menstrual-like cramps, with or without nausea. ? You have about 6 or more contractions in 1 hour, even after you have had a glass of water and are resting. ? You have a low, dull backache that does not go away when you change your position. ? You have pain or pressure in your pelvis that comes and goes in a pattern. ? You have intestinal cramping or flu-like symptoms, with or without diarrhea.  ? You notice an increase or change in your vaginal discharge. Discharge may be heavy, mucus-like, watery, or streaked with blood. ? Your water breaks. · If you think you have  labor:  ? Drink 2 or 3 glasses of water or juice. Not drinking enough fluids can cause contractions. ? Stop what you are doing, and empty your bladder. Then lie down on your left side for at least 1 hour. ? While lying on your side, find your breast bone. Put your fingers in the soft spot just below it. Move your fingers down toward your belly button to find the top of your uterus. Check to see if it is tight. ? Contractions can be weak or strong. Record your contractions for an hour. Time a contraction from the start of one contraction to the start of the next one.  ? Single or several strong contractions without a pattern are called Gordon-Benitez contractions. They are practice contractions but not the start of labor. They often stop if you change what you are doing. ? Call your doctor if you have regular contractions. Where can you learn more? Go to https://TargetXemmaStamp.it.healthTutti Dynamics. org and sign in to your [employer] account. Enter Y474 in the [facility] box to learn more about \"Weeks 26 to 30 of Your Pregnancy: Care Instructions. \"     If you do not have an account, please click on the \"Sign Up Now\" link. Current as of: 2021               Content Version: 13.0  © 9798-5733 Healthwise, Incorporated. Care instructions adapted under license by [facility] ([facility]).  If you have questions about a medical condition or this instruction, always ask your healthcare professional. [address] any warranty or liability for your use of this information. Patient Education        Counting Your Baby's Kicks: Care Instructions  Overview     Counting your baby's kicks is one way your doctor can tell that your baby is healthy. Most women--especially in a first pregnancy--feel their baby move for the first time between 16 and 22 weeks. The movement may feel like flutters rather than kicks. Your baby may move more at certain times of the day. When you are active, you may notice less kicking than when you are resting. At your prenatal visits, your doctor will ask whether the baby is active. In your last trimester, your doctor may ask you to count the number of times you feel your baby move. Follow-up care is a key part of your treatment and safety. Be sure to make and go to all appointments, and call your doctor if you are having problems. It's also a good idea to know your test results and keep a list of the medicines you take. How do you count fetal kicks? · A common method of checking your baby's movement is to note the length of time it takes to count ten movements (such as kicks, flutters, or rolls). · Pick your baby's most active time of day to count. This may be any time from morning to evening. · If you don't feel 10 movements in an hour, have something to eat or drink and count for another hour. If you don't feel at least 10 movements in the 2-hour period, call your doctor. When should you call for help? Call your doctor now or seek immediate medical care if:    · You noticed that your baby has stopped moving or is moving much less than normal.   Watch closely for changes in your health, and be sure to contact your doctor if you have any problems. Where can you learn more? Go to https://flora.PureSense. org and sign in to your [employer] account. Enter X476 in the [facility] box to learn more about \"Counting Your Baby's Kicks: Care Instructions. \"     If you do not have an account, please click on the \"Sign Up Now\" link. Current as of: June 16, 2021               Content Version: 13.0  © 1405-0909 D1G. Care instructions adapted under license by [facility] ([facility]). If you have questions about a medical condition or this instruction, always ask your healthcare professional. [place] [address] any warranty or liability for your use of this information. Patient Education        Learning About When to Call Your Doctor During Pregnancy (After 20 Weeks)  Overview  It's common to have concerns about what might be a problem when you're pregnant. Most pregnancies don't have any serious problems. But it's still important to know when to call your doctor if you have certain symptoms or signs of labor. These are general suggestions. Your doctor may give you some more information about when to call. When to call your doctor (after 20 weeks)  Call 911  anytime you think you may need emergency care. For example, call if:  · You have severe vaginal bleeding. · You have sudden, severe pain in your belly. · You passed out (lost consciousness). · You have a seizure. · You see or feel the umbilical cord. · You think you are about to deliver your baby and can't make it safely to the hospital.  Call your doctor now or seek immediate medical care if:  · You have vaginal bleeding. · You have belly pain. · You have a fever. · You have symptoms of preeclampsia, such as:  ? Sudden swelling of your face, hands, or feet. ? New vision problems (such as dimness, blurring, or seeing spots). ? A severe headache. · You have a sudden release of fluid from your vagina. (You think your water broke.)  · You think that you may be in labor. This means that you've had at least 6 contractions in an hour.   · You notice that your baby has stopped moving or is moving much less than normal.  · You have symptoms of a urinary tract infection. These may include:  ? Pain or burning when you urinate. ? A frequent need to urinate without being able to pass much urine. ? Pain in the flank, which is just below the rib cage and above the waist on either side of the back. ? Blood in your urine. Watch closely for changes in your health, and be sure to contact your doctor if:  · You have vaginal discharge that smells bad. · You have skin changes, such as:  ? A rash. ? Itching. ? Yellow color to your skin. · You have other concerns about your pregnancy. If you have labor signs at 37 weeks or more  If you have signs of labor at 37 weeks or more, your doctor may tell you to call when your labor becomes more active. Symptoms of active labor include:  · Contractions that are regular. · Contractions that are less than 5 minutes apart. · Contractions that are hard to talk through. Follow-up care is a key part of your treatment and safety. Be sure to make and go to all appointments, and call your doctor if you are having problems. It's also a good idea to know your test results and keep a list of the medicines you take. Where can you learn more? Go to https://Domos Labspepiceweb.healthAppAssure Software. org and sign in to your [employer]Shop account. Enter  in the [facility] box to learn more about \"Learning About When to Call Your Doctor During Pregnancy (After 20 Weeks). \"     If you do not have an account, please click on the \"Sign Up Now\" link. Current as of: June 16, 2021               Content Version: 13.0  © 2615-4873 Healthwise, Incorporated. Care instructions adapted under license by [facility] ([facility]). If you have questions about a medical condition or this instruction, always ask your healthcare professional. [place] [address] any warranty or liability for your use of this information.

## 2021-11-30 DIAGNOSIS — O99.810 ABNORMAL GLUCOSE TOLERANCE TEST (GTT) DURING PREGNANCY, ANTEPARTUM: ICD-10-CM

## 2021-11-30 LAB
AMOUNT GLUCOSE GIVEN: NORMAL G
GLUCOSE FASTING: 85 MG/DL (ref 65–99)
GLUCOSE TOLERANCE TEST 1 HOUR: 160 MG/DL (ref 65–184)
GLUCOSE TOLERANCE TEST 2 HOUR: 130 MG/DL (ref 65–139)
GLUCOSE TOLERANCE TEST 3 HOUR: 111 MG/DL (ref 65–130)

## 2021-12-01 ENCOUNTER — ROUTINE PRENATAL (OUTPATIENT)
Dept: OBGYN CLINIC | Age: 31
End: 2021-12-01

## 2021-12-01 VITALS — SYSTOLIC BLOOD PRESSURE: 112 MMHG | WEIGHT: 210 LBS | DIASTOLIC BLOOD PRESSURE: 70 MMHG | BODY MASS INDEX: 32.89 KG/M2

## 2021-12-01 DIAGNOSIS — Z3A.28 28 WEEKS GESTATION OF PREGNANCY: Primary | ICD-10-CM

## 2021-12-01 DIAGNOSIS — Z34.00 SUPERVISION OF NORMAL FIRST PREGNANCY, ANTEPARTUM: ICD-10-CM

## 2021-12-01 PROCEDURE — 0502F SUBSEQUENT PRENATAL CARE: CPT | Performed by: NURSE PRACTITIONER

## 2021-12-01 NOTE — PROGRESS NOTES
+FM, -Ctx, -LOF, -VB  Patient Active Problem List   Diagnosis    FH: spina bifida     Weight 210 lb (95.3 kg), last menstrual period 05/19/2021, not currently breastfeeding. Reviewed kick counts, labor and pre eclampsia precautions  Feeling well  Good FM  Elevated 1 hour, passed 3 hour  Rh positive  tDAP next visit  Has had a cold- starting to feel better.  Did not test for Covid

## 2021-12-15 NOTE — PATIENT INSTRUCTIONS
Patient Education        Weeks 30 to 28 of Your Pregnancy: Care Instructions  Overview     You've made it to the final months of your pregnancy! By now your baby is really starting to look like a baby, with hair and plump skin. As you enter the final weeks of pregnancy, the reality of having a baby may start to set in. This is a good time to set up a safe nursery and find quality  if needed. Doing this stuff ahead of time will allow you to focus on caring for and enjoying your new baby. You may also want to take a tour of your hospital's labor and delivery unit. This will help you get a better idea of what to expect while you're in the hospital.  During these last months, be sure to take good care of yourself. Pay attention to what your body needs. If your doctor says it's okay for you to work, don't push yourself too hard. If you haven't already had the Tdap shot during this pregnancy, talk to your doctor about getting it. It will help protect your  against pertussis infection. Follow-up care is a key part of your treatment and safety. Be sure to make and go to all appointments, and call your doctor if you are having problems. It's also a good idea to know your test results and keep a list of the medicines you take. How can you care for yourself at home? Pay attention to your baby's movements  · You should feel your baby move several times every day. · Your baby now turns less, and kicks and jabs more. · Your baby sleeps 20 to 45 minutes at a time and is more active at certain times of day. · If your doctor wants you to count your baby's kicks:  ? Empty your bladder, and lie on your side or relax in a comfortable chair. ? Write down your start time. ? Pay attention only to your baby's movements. Count any movement except hiccups. ? After you have counted 10 movements, write down your stop time. ? Write down how many minutes it took for your baby to move 10 times.   ? If an hour goes by and you have not recorded 10 movements, have something to eat or drink and then count for another hour. If you don't record at least 10 movements in the 2-hour period, call your doctor. Ease heartburn  · Eat small, frequent meals. · Do not eat chocolate, peppermint, or very spicy foods. Avoid drinks with caffeine, such as coffee, tea, and sodas. · Avoid bending over or lying down after meals. · Take a short walk after you eat. · If heartburn is a problem at night, do not eat for 2 hours before bedtime. · Take antacids like Mylanta, Maalox, Rolaids, or Tums. Do not take antacids that have sodium bicarbonate. Care for varicose veins  · Varicose veins are blood vessels that stretch out with the extra blood during pregnancy. Your legs may ache or throb. Most varicose veins will go away after the birth. · Avoid standing for long periods of time. Sit with your legs crossed at the ankles, not the knees. · Sit with your feet propped up. · Avoid tight clothing or stockings. Wear support hose. · Exercise regularly. Try walking for at least 30 minutes a day. Where can you learn more? Go to https://Copyright Agent.Ubitexx. org and sign in to your CatchThatBus account. Enter G790 in the [facility] box to learn more about \"Weeks 30 to 32 of Your Pregnancy: Care Instructions. \"     If you do not have an account, please click on the \"Sign Up Now\" link. Current as of: June 16, 2021               Content Version: 13.0  © 2006-2021 Viverae. Care instructions adapted under license by [facility] ([facility]). If you have questions about a medical condition or this instruction, always ask your healthcare professional. [place] [address] any warranty or liability for your use of this information. Patient Education        Counting Your Baby's Kicks: Care Instructions  Overview     Counting your baby's kicks is one way your doctor can tell that your baby is healthy.  Most women--especially in a first pregnancy--feel their baby move for the first time between 16 and 22 weeks. The movement may feel like flutters rather than kicks. Your baby may move more at certain times of the day. When you are active, you may notice less kicking than when you are resting. At your prenatal visits, your doctor will ask whether the baby is active. In your last trimester, your doctor may ask you to count the number of times you feel your baby move. Follow-up care is a key part of your treatment and safety. Be sure to make and go to all appointments, and call your doctor if you are having problems. It's also a good idea to know your test results and keep a list of the medicines you take. How do you count fetal kicks? · A common method of checking your baby's movement is to note the length of time it takes to count ten movements (such as kicks, flutters, or rolls). · Pick your baby's most active time of day to count. This may be any time from morning to evening. · If you don't feel 10 movements in an hour, have something to eat or drink and count for another hour. If you don't feel at least 10 movements in the 2-hour period, call your doctor. When should you call for help? Call your doctor now or seek immediate medical care if:    · You noticed that your baby has stopped moving or is moving much less than normal.   Watch closely for changes in your health, and be sure to contact your doctor if you have any problems. Where can you learn more? Go to https://healthfinchsurajAptara.AMGas. org and sign in to your UClass account. Enter I104 in the Fitwall box to learn more about \"Counting Your Baby's Kicks: Care Instructions. \"     If you do not have an account, please click on the \"Sign Up Now\" link. Current as of: June 16, 2021               Content Version: 13.0  © 4054-3906 Healthwise, Incorporated. Care instructions adapted under license by [facility] ([facility]).  If you have questions about a medical condition or this instruction, always ask your healthcare professional. [place] [address] any warranty or liability for your use of this information. Patient Education        Learning About When to Call Your Doctor During Pregnancy (After 20 Weeks)  Overview  It's common to have concerns about what might be a problem when you're pregnant. Most pregnancies don't have any serious problems. But it's still important to know when to call your doctor if you have certain symptoms or signs of labor. These are general suggestions. Your doctor may give you some more information about when to call. When to call your doctor (after 20 weeks)  Call 911  anytime you think you may need emergency care. For example, call if:  · You have severe vaginal bleeding. · You have sudden, severe pain in your belly. · You passed out (lost consciousness). · You have a seizure. · You see or feel the umbilical cord. · You think you are about to deliver your baby and can't make it safely to the hospital.  Call your doctor now or seek immediate medical care if:  · You have vaginal bleeding. · You have belly pain. · You have a fever. · You have symptoms of preeclampsia, such as:  ? Sudden swelling of your face, hands, or feet. ? New vision problems (such as dimness, blurring, or seeing spots). ? A severe headache. · You have a sudden release of fluid from your vagina. (You think your water broke.)  · You think that you may be in labor. This means that you've had at least 6 contractions in an hour. · You notice that your baby has stopped moving or is moving much less than normal.  · You have symptoms of a urinary tract infection. These may include:  ? Pain or burning when you urinate. ? A frequent need to urinate without being able to pass much urine. ? Pain in the flank, which is just below the rib cage and above the waist on either side of the back. ? Blood in your urine.   Watch closely for changes in your health, and be sure to contact your doctor if:  · You have vaginal discharge that smells bad. · You have skin changes, such as:  ? A rash. ? Itching. ? Yellow color to your skin. · You have other concerns about your pregnancy. If you have labor signs at 37 weeks or more  If you have signs of labor at 37 weeks or more, your doctor may tell you to call when your labor becomes more active. Symptoms of active labor include:  · Contractions that are regular. · Contractions that are less than 5 minutes apart. · Contractions that are hard to talk through. Follow-up care is a key part of your treatment and safety. Be sure to make and go to all appointments, and call your doctor if you are having problems. It's also a good idea to know your test results and keep a list of the medicines you take. Where can you learn more? Go to https://chpegeorgeeweb.APerfectShirt.com. org and sign in to your Cal[employer]so Wireless account. Enter  in the [employer]lex box to learn more about \"Learning About When to Call Your Doctor During Pregnancy (After 20 Weeks). \"     If you do not have an account, please click on the \"Sign Up Now\" link. Current as of: June 16, 2021               Content Version: 13.0  © 2006-2021 Healthwise, Incorporated. Care instructions adapted under license by [employer]. If you have questions about a medical condition or this instruction, always ask your healthcare professional. [place] [address] any warranty or liability for your use of this information.

## 2021-12-16 ENCOUNTER — ROUTINE PRENATAL (OUTPATIENT)
Dept: OBGYN CLINIC | Age: 31
End: 2021-12-16
Payer: COMMERCIAL

## 2021-12-16 VITALS — BODY MASS INDEX: 33.92 KG/M2 | DIASTOLIC BLOOD PRESSURE: 60 MMHG | WEIGHT: 216.6 LBS | SYSTOLIC BLOOD PRESSURE: 126 MMHG

## 2021-12-16 DIAGNOSIS — Z34.00 SUPERVISION OF NORMAL FIRST PREGNANCY, ANTEPARTUM: ICD-10-CM

## 2021-12-16 DIAGNOSIS — Z3A.30 30 WEEKS GESTATION OF PREGNANCY: Primary | ICD-10-CM

## 2021-12-16 DIAGNOSIS — Z23 NEED FOR TDAP VACCINATION: ICD-10-CM

## 2021-12-16 DIAGNOSIS — Z82.79 FH: SPINA BIFIDA: ICD-10-CM

## 2021-12-16 PROCEDURE — 0502F SUBSEQUENT PRENATAL CARE: CPT | Performed by: NURSE PRACTITIONER

## 2021-12-16 PROCEDURE — 90715 TDAP VACCINE 7 YRS/> IM: CPT | Performed by: NURSE PRACTITIONER

## 2021-12-16 PROCEDURE — 90471 IMMUNIZATION ADMIN: CPT | Performed by: NURSE PRACTITIONER

## 2021-12-16 NOTE — PROGRESS NOTES
After obtaining consent, and per orders of [employer] , injection of TDAP given in Left deltoid by Shaquille Garcia MA. Patient instructed to remain in clinic for 20 minutes afterwards, and to report any adverse reaction to me immediately.   LOT [id number]   EXP 6/5/23   [address] [id number]

## 2021-12-28 ENCOUNTER — TELEPHONE (OUTPATIENT)
Dept: PRIMARY CARE CLINIC | Age: 31
End: 2021-12-28

## 2021-12-28 NOTE — TELEPHONE ENCOUNTER
----- Message from Don  sent at 2021 12:47 PM EST -----  Subject: Appointment Request    Reason for Call: New Patient Request Appointment    QUESTIONS  Type of Appointment? New Patient/New to Provider  Reason for appointment request? Other - Office told me to send message  Additional Information for Provider? Patient wanting to establish arnoldo Meyer as a new patient and to help also address 32 week pregnancy. [facility] desk instructed to leave message for this provider to approve them.   ---------------------------------------------------------------------------  --------------  CALL BACK INFO  What is the best way for the office to contact you? OK to leave message on   voicemail  Preferred Call Back Phone Number? [phone]  ---------------------------------------------------------------------------  --------------  SCRIPT ANSWERS  Relationship to Patient? Self  Specialty Confirmation? Primary Care  Is this the first appointment to establish care for a ? No  Have you been diagnosed with, awaiting test results for, or told that you   are suspected of having COVID-19 (Coronavirus)? (If patient has tested   negative or was tested as a requirement for work, school, or travel and   not based on symptoms, answer no)? No  Within the past two weeks have you developed any of the following symptoms   (answer no if symptoms have been present longer than 2 weeks or began   more than 2 weeks ago)? Fever or Chills, Cough, Shortness of breath or   difficulty breathing, Loss of taste or smell, Sore throat, Nasal   congestion, Sneezing or runny nose, Fatigue or generalized body aches   (answer no if pain is specific to a body part e.g. back pain), Diarrhea,   Headache? No  Have you had close contact with someone with COVID-19 in the last 14 days? No  (Service Expert  click yes below to proceed with [employer] As Usual   Scheduling)?  Yes

## 2021-12-28 NOTE — PATIENT INSTRUCTIONS
pneumonia. ? Be obese or get diabetes later in life. · Breastfeeding causes the release of a hormone called oxytocin. This hormone may help your uterus shrink back faster. · Breastfeeding may help you lose weight faster. Making milk burns calories. · Breastfeeding can lower your risk of breast cancer, ovarian cancer, and osteoporosis. Decide about circumcision for your baby  · As you make this decision, it may help to think about your personal, [employer]ec[employer]ism, and family traditions. You get to decide if you will keep your baby's penis natural or if your baby will be circumcised. · If you decide that you would like to have your baby circumcised, talk with your doctor. You can share your concerns about pain. And you can discuss your preferences for anesthesia. Where can you learn more? Go to https://Beetailer.Crushpath. org and sign in to your [employer] account. Enter D378 in the ticketscript box to learn more about \"Weeks 32 to 34 of Your Pregnancy: Care Instructions. \"     If you do not have an account, please click on the \"Sign Up Now\" link. Current as of: June 16, 2021               Content Version: 13.1  © 1023-4116 Diveboard. Care instructions adapted under license by [facility] ([facility]). If you have questions about a medical condition or this instruction, always ask your healthcare professional. [address] any warranty or liability for your use of this information. Patient Education        Counting Your Baby's Kicks: Care Instructions  Overview     Counting your baby's kicks is one way your doctor can tell that your baby is healthy. Most women--especially in a first pregnancy--feel their baby move for the first time between 16 and 22 weeks. The movement may feel like flutters rather than kicks. Your baby may move more at certain times of the day. When you are active, you may notice less kicking than when you are resting.  At your prenatal visits, your doctor will ask whether the baby is active. In your last trimester, your doctor may ask you to count the number of times you feel your baby move. Follow-up care is a key part of your treatment and safety. Be sure to make and go to all appointments, and call your doctor if you are having problems. It's also a good idea to know your test results and keep a list of the medicines you take. How do you count fetal kicks? · A common method of checking your baby's movement is to note the length of time it takes to count ten movements (such as kicks, flutters, or rolls). · Pick your baby's most active time of day to count. This may be any time from morning to evening. · If you don't feel 10 movements in an hour, have something to eat or drink and count for another hour. If you don't feel at least 10 movements in the 2-hour period, call your doctor. When should you call for help? Call your doctor now or seek immediate medical care if:    · You noticed that your baby has stopped moving or is moving much less than normal.   Watch closely for changes in your health, and be sure to contact your doctor if you have any problems. Where can you learn more? Go to https://nPulse Technologies.Iotelligent. org and sign in to your [employer] account. Enter L205 in the [employer] box to learn more about \"Counting Your Baby's Kicks: Care Instructions. \"     If you do not have an account, please click on the \"Sign Up Now\" link. Current as of: June 16, 2021               Content Version: 13.1  © 2932-3886 Healthwise, Incorporated. Care instructions adapted under license by [facility] ([facility]). If you have questions about a medical condition or this instruction, always ask your healthcare professional. [place] [address] any warranty or liability for your use of this information.        Patient Education        Learning About When to Call Your Doctor During Pregnancy (After 20 Weeks)  Overview  It's common to have at 37 weeks or more, your doctor may tell you to call when your labor becomes more active. Symptoms of active labor include:  · Contractions that are regular. · Contractions that are less than 5 minutes apart. · Contractions that are hard to talk through. Follow-up care is a key part of your treatment and safety. Be sure to make and go to all appointments, and call your doctor if you are having problems. It's also a good idea to know your test results and keep a list of the medicines you take. Where can you learn more? Go to https://AUM CardiovascularpeDearLocal.FirmPlay. org and sign in to your Kyoger account. Enter  in the [facility] box to learn more about \"Learning About When to Call Your Doctor During Pregnancy (After 20 Weeks). \"     If you do not have an account, please click on the \"Sign Up Now\" link. Current as of: June 16, 2021               Content Version: 13.1  © 2006-2021 Healthwise, [facility]. Care instructions adapted under license by [facility] ([facility]). If you have questions about a medical condition or this instruction, always ask your healthcare professional. [place] [address] any warranty or liability for your use of this information.

## 2021-12-30 ENCOUNTER — ROUTINE PRENATAL (OUTPATIENT)
Dept: OBGYN CLINIC | Age: 31
End: 2021-12-30

## 2021-12-30 VITALS — BODY MASS INDEX: 34.46 KG/M2 | DIASTOLIC BLOOD PRESSURE: 74 MMHG | SYSTOLIC BLOOD PRESSURE: 122 MMHG | WEIGHT: 220 LBS

## 2021-12-30 DIAGNOSIS — Z34.00 SUPERVISION OF NORMAL FIRST PREGNANCY, ANTEPARTUM: ICD-10-CM

## 2021-12-30 DIAGNOSIS — Z3A.32 32 WEEKS GESTATION OF PREGNANCY: Primary | ICD-10-CM

## 2021-12-30 DIAGNOSIS — Z82.79 FH: SPINA BIFIDA: ICD-10-CM

## 2021-12-30 PROCEDURE — 0502F SUBSEQUENT PRENATAL CARE: CPT | Performed by: NURSE PRACTITIONER

## 2021-12-30 NOTE — PROGRESS NOTES
+FM, -Ctx, -LOF, -VB  Patient Active Problem List   Diagnosis    FH: spina bifida     Blood pressure 122/74, weight 220 lb (99.8 kg), last menstrual period 05/19/2021, not currently breastfeeding.   Reviewed kick counts, labor and pre eclampsia precautions  Feeling well  Good FM  Having BH intermittently

## 2022-01-03 ENCOUNTER — HOSPITAL ENCOUNTER (OUTPATIENT)
Age: 32
Discharge: HOME | End: 2022-01-03
Attending: OBSTETRICS & GYNECOLOGY | Admitting: OBSTETRICS & GYNECOLOGY
Payer: COMMERCIAL

## 2022-01-03 VITALS
DIASTOLIC BLOOD PRESSURE: 68 MMHG | OXYGEN SATURATION: 99 % | RESPIRATION RATE: 18 BRPM | SYSTOLIC BLOOD PRESSURE: 138 MMHG | HEART RATE: 83 BPM | TEMPERATURE: 97.5 F

## 2022-01-03 PROCEDURE — 99213 OFFICE O/P EST LOW 20 MIN: CPT

## 2022-01-03 PROCEDURE — 99213 OFFICE O/P EST LOW 20 MIN: CPT | Performed by: OBSTETRICS & GYNECOLOGY

## 2022-01-03 PROCEDURE — 76818 FETAL BIOPHYS PROFILE W/NST: CPT

## 2022-01-03 PROCEDURE — G0463 HOSPITAL OUTPT CLINIC VISIT: HCPCS

## 2022-01-03 NOTE — FLOWSHEET NOTE
Patient arrives to L&D with c/o decreased fetal movement. Denies LOF or vaginal bleeding. EFM explained and applied.

## 2022-01-03 NOTE — H&P
OBSTETRICAL HISTORY Arturo Gaviria    Date: 2022       Time: 8:41 AM   Patient Name: Iraida Torres     Patient : 1990  [facility] [address]    Admission Date/Time: 1/3/2022  4:13 PM      CC: Decreased fetal movement     HPI: Iraida Torres is a 28 y.o. Christiana Boas at 32w5d who presents to Labor and Delivery Triage with the complaint of decreased fetal movement since this morning. She reports she has felt the baby move today, just not as much as she usually does. However after arriving she admits she feels more movement. The patient reports fetal movement is present but decreased, denies contractions, denies loss of fluid, denies vaginal bleeding. Denies recent trauma or abdominal pain. Patient denies any headache, visual changes, difficulty breathing, RUQ pain, N/V, F/C, and pain/swelling in lower extremities. Denies any dysuria or vaginal discharge. DATING:  LMP: Patient's last menstrual period was 2021 (exact date).   Estimated Date of Delivery: 22   Based on: LMP on 21    PREGNANCY RISK FACTORS:  Patient Active Problem List   Diagnosis    FH: spina bifida        Steroids Given In This Pregnancy:  no     REVIEW OF SYSTEMS:   Constitutional: negative fever, negative chills, negative weight changes   HEENT: negative visual disturbances, negative headaches, negative dizziness, negative hearing loss  Breast: Negative breast abnormalities, negative breast lumps, negative nipple discharge  Respiratory: negative dyspnea, negative cough, negative SOB  Cardiovascular: negative chest pain,  negative palpitations, negative arrhythmia, negative syncope   Gastrointestinal: negative abdominal pain, negative RUQ pain, negative N/V, negative diarrhea, negative constipation, negative bowel changes, negative heartburn   Genitourinary: negative dysuria, negative hematuria, negative urinary incontinence, negative vaginal discharge, negative vaginal bleeding or spotting  Dermatological: negative rash, negative pruritis, negative mole or other skin changes  Hematologic: negative bruising  Immunologic/Lymphatic: negative recent illness, negative recent sick contact  Musculoskeletal: negative back pain, negative myalgias, negative arthralgias  Neurological:  negative dizziness, negative migraines, negative seizures, negative weakness  Behavior/Psych: negative depression, negative anxiety, negative SI, negative HI    OBSTETRICAL HISTORY:   OB History    Para Term  AB Living   1 0 0 0 0 0   SAB IAB Ectopic Molar Multiple Live Births   0 0 0 0 0 0      # Outcome Date GA Lbr Kd/2nd Weight Sex Delivery Anes PTL Lv   1 Current               Obstetric Comments   1 step son        PAST MEDICAL HISTORY:   has a past medical history of Atypical squamous cells of undetermined significance (ASCUS) on Papanicolaou smear of cervix. PAST SURGICAL HISTORY:   has a past surgical history that includes Tonsillectomy and [place] tooth extraction (2019). ALLERGIES:  has No Known Allergies. MEDICATIONS:  Prior to Admission medications    Medication Sig Start Date End Date Taking?  Authorizing Provider   aspirin 81 MG chewable tablet Take 81 mg by mouth daily   Yes Historical Provider, MD   Prenatal Vit-Fe Fumarate-FA (PRENATAL PO) Take by mouth   Yes Historical Provider, MD       FAMILY HISTORY:  family history includes Allergies in her brother; Asthma in her brother; Breast Cancer (age of onset: 77) in her paternal grandmother; Cancer (age of onset: 76) in her paternal grandmother; Colon Cancer (age of onset: 61) in her maternal grandfather and maternal grandmother; Diabetes (age of onset: 61) in her maternal grandfather and maternal grandmother; Heart Attack in her paternal grandfather; Heart Disease in her father and maternal grandfather; Hypertension in her maternal grandfather, maternal grandmother, and paternal grandfather; Hypertension (age of onset: 39) in her father; Kidney Disease in her maternal grandmother; No Known Problems in her mother. SOCIAL HISTORY:   reports that she has quit smoking. She has never used smokeless tobacco. She reports that she does not drink alcohol and does not use drugs. VITALS:  Vitals:    01/03/22 1629   BP: 138/68   Pulse: 83   Resp: 18   Temp: 97.5 °F (36.4 °C)   TempSrc: Oral   SpO2: 99%         PHYSICAL EXAM:  Fetal Heart Monitor:  Baseline Heart Rate 140, moderate variability, present accelerations, one variable deceleration lasting approximately 75 seconds with spontaneous return to baseline  [place]: contractions, none    General appearance:  no apparent distress, alert and cooperative  HEENT: head atraumatic, normocephalic, moist mucous membranes, trachea midline  Neurologic:  alert, oriented, normal speech, no focal findings or movement disorder noted  Lungs:  No increased work of breathing, good air exchange  Heart:  regular rate and rhythm  Abdomen:  soft, gravid, non-tender, no rebound, guarding, or rigidity, no RUQ or epigastric tenderness, no signs or symptoms of abruption, no signs or symptoms of chorioamnionitis  Extremities:  no calf tenderness, non edematous, no varicosities, full range of motion in all four extremities  Musculoskeletal: Gross strength equal and intact throughout, no gross abnormalities, range of motion normal in hips, knees, shoulders and spine  Psychiatric: Mood appropriate, normal affect   Rectal Exam: not indicated  Pelvic Exam: not indicated     BIOPHYSICAL PROFILE:  Position: Cephalic  Placental Location: anterior  Fetal Heart Tones: Present  Fetal Breathing: Present  Fetal Tone: Present  Fetal Movement: Present  Amniotic Fluid Index/Volume: 10.1 cm    PRENATAL LAB RESULTS:   Blood Type/Rh: A pos  Antibody Screen: negative  Hemoglobin, Hematocrit, Platelets: Hgb 04.2/OFV 40.4/Plt 248  Rubella: immune  T.  Pallidum, IgG: non-reactive  Hepatitis B Surface Antigen: non-reactive   Hepatitis C Antibody: non-reactive   HIV: non-reactive   Sickle Cell Screen: not done  Gonorrhea: negative  Chlamydia: negative  Urine culture: negative, date: 21    1 hour Glucose Tolerance Test: 137  3 hour Glucose Tolerance Test: Fastin; 1 hour: 160; 2 hour  130; 3 hour: 111    Group B Strep: not done  Cystic Fibrosis Screen: negative  First Trimester Screen: low risk for aneuploidy  MSAFP/Multiple Markers: negative  Non-Invasive Prenatal Testing: not done  Anatomy US: anterior placenta, 3VC, female gender, normal anatomy    ASSESSMENT & PLAN:  Abe Turcios is a 28 y.o. female  at [address] [place] [address],[address]   - GBS unknown / Rh positive / R immune   - Pen G for GBS prophylaxis if delivery imminent    - VSS, afebrile    Decreased fetal movement   -cEFM/TOCO: overall reassuring tracing with moderate variability and accelerations present, one variable deceleration, no contractions   - BPP: , JOSE 10.1 cm   - Will keep patient for extended monitoring due to variable deceleration, but overall clinical picture reassuring       FHx spina bifida   - MSAFP negative   - No overt evidence of open neural tube defect on MFM US 10/7/21    Abnormal 1 hour GTT, 3 hr wnl    Hx ASCUS, +HRHPV   - Pap 2019   - Negative Pap 20 and 21    BMI 34         Patient Active Problem List    Diagnosis Date Noted    FH: spina bifida 2021     MGF spina bifida:  -scheduled for NT screening  -POC AFP 16-20 weeks  Anatomy US normal         Plan discussed with Dr. Darren Bonner, who is agreeable. Steroids given this admission: No    Risks, benefits, alternatives and possible complications have been discussed in detail with the patient. Admission, and post admission procedures and expectations were discussed in detail. All questions were answered.     Attending's Name: Dr. Sandra Servin DO  Ob/Gyn Resident  2022, 8:41 AM

## 2022-01-04 NOTE — FLOWSHEET NOTE
Patient discharged ambulatory to private vehicle. Discharge paperwork given and discussed, questions and concerns answered to the best of writers knowledge. Patient verbalized understanding of keeping all scheduled medications and taking all medications as prescribed.

## 2022-01-12 NOTE — PATIENT INSTRUCTIONS
Patient Education        Weeks 34 to 39 of Your Pregnancy: Care Instructions  Overview     By now, your baby and your belly have grown quite large. It's almost time to give birth! Your baby's lungs are almost ready to breathe air. The skull bones are firm enough to protect your baby's head, but soft enough to move down through the birth canal.  You may be feeling excited and happy at times--but also anxious or scared. You might wonder how you'll know if you're in labor or what to expect during labor. Try to be open and flexible in your expectations of the birth. Because each birth is different, there's no way to know exactly what childbirth will be like for you. Talk to your doctor or midwife about any concerns you have. If you haven't already had the Tdap shot during this pregnancy, talk to your doctor about getting it. It will help protect your  against pertussis infection. In the 36th week, you'll probably have a test for group B streptococcus (GBS). GBS is a common type of bacteria that can live in the vagina and rectum. It can make your baby sick after birth. If you test positive, you will get antibiotics during labor. The medicine will help keep your baby from getting the bacteria. Follow-up care is a key part of your treatment and safety. Be sure to make and go to all appointments, and call your doctor if you are having problems. It's also a good idea to know your test results and keep a list of the medicines you take. How can you care for yourself at home? Learn about pain relief choices  · Pain is different for everyone. Talk with your doctor about your feelings about pain. · You can choose from several types of pain relief. These include medicine, breathing techniques, and comfort measures. You can use more than one option. · If you choose to have pain medicine during labor, talk to your doctor about your options. Some medicines lower anxiety and help with some of the pain.  Others make your lower body numb so that you won't feel pain. · Be sure to tell your doctor about your pain medicine choice before you start labor or very early in your labor. You may be able to change your mind as labor progresses. Labor and delivery  · The first stage of labor has three parts: early, active, and transition. ? It's common to have early labor at home. You can stay busy or rest, eat light snacks, drink clear fluids, and start counting contractions. ? When talking during a contraction gets hard, you may be moving to active labor. During active labor, you should head for the hospital if you aren't there already. ? You are in active labor when contractions come every 3 to 4 minutes and last about 60 seconds. Your cervix is opening more rapidly. ? If your water breaks, contractions will come faster and stronger. ? During transition, your cervix is stretching, and contractions are coming more rapidly. ? You may want to push, but your cervix might not be ready. Your doctor will tell you when to push. · The second stage starts when your cervix is completely opened and you are ready to push. ? Contractions are very strong to push the baby down the birth canal.  ? You will probably feel the urge to push. You may feel like you need to have a bowel movement. ? You may be coached to push with contractions. These contractions will be very strong, but you won't have them as often. You can get a little rest between contractions. ? One last push, and your baby is born. · The third stage is when a few more contractions push out the placenta. This may take 30 minutes or less. Where can you learn more? Go to https://flora.nap- Naturally Attached Parents. org and sign in to your FotoIN Mobile account. Enter A469 in the MooBella box to learn more about \"Weeks 34 to 36 of Your Pregnancy: Care Instructions. \"     If you do not have an account, please click on the \"Sign Up Now\" link.   Current as of: June 16, 2021               Content Version: 13.1  © 6722-6731 [employer]Sho[employer]. Care instructions adapted under license by [facility] ([facility]). If you have questions about a medical condition or this instruction, always ask your healthcare professional. [address] any warranty or liability for your use of this information. Patient Education        Counting Your Baby's Kicks: Care Instructions  Overview     Counting your baby's kicks is one way your doctor can tell that your baby is healthy. Most women--especially in a first pregnancy--feel their baby move for the first time between 16 and 22 weeks. The movement may feel like flutters rather than kicks. Your baby may move more at certain times of the day. When you are active, you may notice less kicking than when you are resting. At your prenatal visits, your doctor will ask whether the baby is active. In your last trimester, your doctor may ask you to count the number of times you feel your baby move. Follow-up care is a key part of your treatment and safety. Be sure to make and go to all appointments, and call your doctor if you are having problems. It's also a good idea to know your test results and keep a list of the medicines you take. How do you count fetal kicks? · A common method of checking your baby's movement is to note the length of time it takes to count ten movements (such as kicks, flutters, or rolls). · Pick your baby's most active time of day to count. This may be any time from morning to evening. · If you don't feel 10 movements in an hour, have something to eat or drink and count for another hour. If you don't feel at least 10 movements in the 2-hour period, call your doctor. When should you call for help?    Call your doctor now or seek immediate medical care if:    · You noticed that your baby has stopped moving or is moving much less than normal.   Watch closely for changes in your health, and be sure to contact your doctor if you have any problems. Where can you learn more? Go to https://chpepiceweb.[employer]. org and sign in to your Skoodat account. Enter V442 in the FriendFeed box to learn more about \"Counting Your Baby's Kicks: Care Instructions. \"     If you do not have an account, please click on the \"Sign Up Now\" link. Current as of: June 16, 2021               Content Version: 13.1  © 3598-2476 Healthwise, Hooked. Care instructions adapted under license by [facility] ([facility]). If you have questions about a medical condition or this instruction, always ask your healthcare professional. [place] [address] any warranty or liability for your use of this information. Patient Education        Learning About When to Call Your Doctor During Pregnancy (After 20 Weeks)  Overview  It's common to have concerns about what might be a problem when you're pregnant. Most pregnancies don't have any serious problems. But it's still important to know when to call your doctor if you have certain symptoms or signs of labor. These are general suggestions. Your doctor may give you some more information about when to call. When to call your doctor (after 20 weeks)  Call 911  anytime you think you may need emergency care. For example, call if:  · You have severe vaginal bleeding. · You have sudden, severe pain in your belly. · You passed out (lost consciousness). · You have a seizure. · You see or feel the umbilical cord. · You think you are about to deliver your baby and can't make it safely to the hospital.  Call your doctor now or seek immediate medical care if:  · You have vaginal bleeding. · You have belly pain. · You have a fever. · You have symptoms of preeclampsia, such as:  ? Sudden swelling of your face, hands, or feet. ? New vision problems (such as dimness, blurring, or seeing spots). ? A severe headache. · You have a sudden release of fluid from your vagina.  (You think your water broke.)  · You think that you may be in labor. This means that you've had at least 6 contractions in an hour. · You notice that your baby has stopped moving or is moving much less than normal.  · You have symptoms of a urinary tract infection. These may include:  ? Pain or burning when you urinate. ? A frequent need to urinate without being able to pass much urine. ? Pain in the flank, which is just below the rib cage and above the waist on either side of the back. ? Blood in your urine. Watch closely for changes in your health, and be sure to contact your doctor if:  · You have vaginal discharge that smells bad. · You have skin changes, such as:  ? A rash. ? Itching. ? Yellow color to your skin. · You have other concerns about your pregnancy. If you have labor signs at 37 weeks or more  If you have signs of labor at 37 weeks or more, your doctor may tell you to call when your labor becomes more active. Symptoms of active labor include:  · Contractions that are regular. · Contractions that are less than 5 minutes apart. · Contractions that are hard to talk through. Follow-up care is a key part of your treatment and safety. Be sure to make and go to all appointments, and call your doctor if you are having problems. It's also a good idea to know your test results and keep a list of the medicines you take. Where can you learn more? Go to https://xzoopsemmaeb.healthLiquidPlanner. org and sign in to your GoodPeople account. Enter  in the [facility] box to learn more about \"Learning About When to Call Your Doctor During Pregnancy (After 20 Weeks). \"     If you do not have an account, please click on the \"Sign Up Now\" link. Current as of: June 16, 2021               Content Version: 13.1  © 8640-1760 Healthwise, Incorporated. Care instructions adapted under license by [facility] ([facility]).  If you have questions about a medical condition or this instruction, always ask your healthcare professional. [address] any warranty or liability for your use of this information. Patient Education        Group B Strep During Pregnancy: Care Instructions  Overview     Group B strep infection is caused by a type of bacteria. It's a different kind of bacteria than the kind that causes strep throat. You may have this kind of bacteria in your body. Sometimes it may cause an infection, but most of the time it doesn't make you sick or cause symptoms. But if you pass the bacteria to your baby during the birth, it can cause serious health problems for your baby. If you have this bacteria in your body, you will get antibiotics when you are in labor. Antibiotics help prevent problems for a  baby. After birth, doctors will watch and may test your baby. If your baby tests positive for Group B strep, your baby will get antibiotics. If you plan to breastfeed your baby, don't worry. It will be safe to breastfeed. Follow-up care is a key part of your treatment and safety. Be sure to make and go to all appointments, and call your doctor if you are having problems. It's also a good idea to know your test results and keep a list of the medicines you take. How can you care for yourself at home? · If your doctor has prescribed antibiotics, take them as directed. Do not stop taking them just because you feel better. You need to take the full course of antibiotics. · Tell your doctor if you are allergic to any antibiotic. · If your water breaks, go to the hospital right away. Your doctor will give you antibiotics to help protect your baby from infection. · Tell the doctors and nurses at the hospital that you tested positive for group B strep. When should you call for help? Call your doctor now or seek immediate medical care if:    · You have symptoms of a urinary tract infection. These may include:  ? Pain or burning when you urinate.   ? A frequent need to urinate without being able to pass much urine. ? Pain in the flank, which is just below the rib cage and above the waist on either side of the back. ? Blood in your urine. ? A fever.     · You think your water has broken.     · You have pain in your belly or pelvis. Watch closely for changes in your health, and be sure to contact your doctor if you have any problems. Where can you learn more? Go to https://SkyscraperpePhotonic Materialseb.Betyah. org and sign in to your [employer] account. Enter M001 in the In Loco Media box to learn more about \"Group B Strep During Pregnancy: Care Instructions. \"     If you do not have an account, please click on the \"Sign Up Now\" link. Current as of: June 16, 2021               Content Version: 13.1  © 2006-2021 Healthwise, Incorporated. Care instructions adapted under license by [facility]. If you have questions about a medical condition or this instruction, always ask your healthcare professional. [place] [address] any warranty or liability for your use of this information.

## 2022-01-13 ENCOUNTER — ROUTINE PRENATAL (OUTPATIENT)
Dept: OBGYN CLINIC | Age: 32
End: 2022-01-13

## 2022-01-13 VITALS — BODY MASS INDEX: 35.33 KG/M2 | DIASTOLIC BLOOD PRESSURE: 62 MMHG | SYSTOLIC BLOOD PRESSURE: 136 MMHG | WEIGHT: 225.6 LBS

## 2022-01-13 DIAGNOSIS — Z3A.34 34 WEEKS GESTATION OF PREGNANCY: Primary | ICD-10-CM

## 2022-01-13 DIAGNOSIS — Z82.79 FH: SPINA BIFIDA: ICD-10-CM

## 2022-01-13 DIAGNOSIS — Z34.00 SUPERVISION OF NORMAL FIRST PREGNANCY, ANTEPARTUM: ICD-10-CM

## 2022-01-13 PROCEDURE — 0502F SUBSEQUENT PRENATAL CARE: CPT | Performed by: NURSE PRACTITIONER

## 2022-01-13 NOTE — PROGRESS NOTES
+FM, -Ctx, -LOF, -VB  Patient Active Problem List   Diagnosis    FH: spina bifida    32 weeks gestation of pregnancy    Decreased fetal movements in third trimester     Blood pressure 136/62, weight 225 lb 9.6 oz (102.3 kg), last menstrual period 05/19/2021, not currently breastfeeding. Reviewed kick counts, labor and pre eclampsia precautions  Feeling well  Good FM  Seen in L&D on 1/3 for decreased FM.   BPP 8/8 Cat 1 tracing  GBS next visit

## 2022-01-26 PROBLEM — O99.810 ABNORMAL GLUCOSE TOLERANCE AFFECTING PREGNANCY, ANTEPARTUM: Status: ACTIVE | Noted: 2022-01-26

## 2022-01-26 NOTE — PROGRESS NOTES
Prenatal Visit    Amita Masters is a 28 y.o. female  at 36w0d    The patient was seen and evaluated. Reports positive fetal movements. She denies headache, vision changes, RUQ pain, contractions, vaginal bleeding and leakage of fluid. The patient was instructed on fetal kick counts and was given a kick sheet to complete every 8 hours. She was instructed that the baby should move at a minimum of ten times within one hour after a meal. The patient was instructed to lay down on her left side twenty minutes after eating and count movements for up to one hour with a target value of ten movements. She was instructed to notify the office if she did not make that target after two attempts or if after any attempt there was less than four movements. The patient admits to that the targets have been made. The patient already received the T-Dap Vaccine (27-36 weeks) this pregnancy. The patient declined the influenza vaccine this year. The problem list reflects the active issues addressed during today's visit. Allergies: Allergies   Allergen Reactions    Latex Rash     Not every time, causes irritation        Vitals:    BP: 122/68  Weight: 229 lb (103.9 kg)  Fundal Height (cm): 36 cm  Fetal Heart Rate: 155     Labs:  Group Beta Strep collection was done. Sensitivities for clindamycin and erythromycin were not ordered. Assessment & Plan:  Amita Masters is a 28 y.o. female  at 36w0d   - GBS testing was ordered   - The ACIP recommended pregnant patients be included in phase 1C of vaccine distribution. This decision is supported by [facility] and ACOG. As of 2021, there have been over 30,000 pregnant patients included in the V-safe post COVID vaccination safety . Most (73%) reports to VAERS among pregnant women involved non-pregnancyspecific adverse events (e.g., local and systemic reactions).  Miscarriage was the most frequently reported pregnancy-specific adverse event to VAERS; numbers are within the known background rates based on presumed COVID-19 vaccine doses administered to pregnant women. No unexpected pregnancy or infant outcomes have been observed related to  COVID-19 vaccination during pregnancy. Recommended patient proceed with vaccination. Patient Active Problem List    Diagnosis Date Noted    Elevated 1 hr, normal 3 hr GTT 01/26/2022    FH: spina bifida 08/17/2021     MGF spina bifida:  -scheduled for NT screening  -POC AFP 16-20 weeks  Anatomy US normal         Return in about 1 week (around 2/3/2022) for DO Yandy Lozano Ob/Gyn   1/27/2022, 9:32 AM

## 2022-01-27 ENCOUNTER — ROUTINE PRENATAL (OUTPATIENT)
Dept: OBGYN CLINIC | Age: 32
End: 2022-01-27

## 2022-01-27 ENCOUNTER — HOSPITAL ENCOUNTER (OUTPATIENT)
Age: 32
Setting detail: SPECIMEN
Discharge: HOME | End: 2022-01-27

## 2022-01-27 VITALS
WEIGHT: 229 LBS | HEIGHT: 67 IN | SYSTOLIC BLOOD PRESSURE: 122 MMHG | BODY MASS INDEX: 35.94 KG/M2 | DIASTOLIC BLOOD PRESSURE: 68 MMHG

## 2022-01-27 DIAGNOSIS — O99.810 ABNORMAL GLUCOSE TOLERANCE AFFECTING PREGNANCY, ANTEPARTUM: ICD-10-CM

## 2022-01-27 DIAGNOSIS — K21.9 GASTROESOPHAGEAL REFLUX DISEASE WITHOUT ESOPHAGITIS: ICD-10-CM

## 2022-01-27 DIAGNOSIS — Z3A.36 36 WEEKS GESTATION OF PREGNANCY: ICD-10-CM

## 2022-01-27 DIAGNOSIS — Z3A.36 36 WEEKS GESTATION OF PREGNANCY: Primary | ICD-10-CM

## 2022-01-27 DIAGNOSIS — Z82.79 FH: SPINA BIFIDA: ICD-10-CM

## 2022-01-27 PROCEDURE — 0502F SUBSEQUENT PRENATAL CARE: CPT | Performed by: STUDENT IN AN ORGANIZED HEALTH CARE EDUCATION/TRAINING PROGRAM

## 2022-01-27 RX ORDER — OMEPRAZOLE 20 MG/1
20 TABLET, DELAYED RELEASE ORAL DAILY
Qty: 30 TABLET | Refills: 3 | Status: SHIPPED | OUTPATIENT
Start: 2022-01-27 | End: 2022-03-22

## 2022-01-30 LAB
CULTURE: NORMAL
Lab: NORMAL
SPECIMEN DESCRIPTION: NORMAL

## 2022-02-04 ENCOUNTER — TELEMEDICINE (OUTPATIENT)
Dept: OBGYN CLINIC | Age: 32
End: 2022-02-04

## 2022-02-04 DIAGNOSIS — Z3A.37 37 WEEKS GESTATION OF PREGNANCY: Primary | ICD-10-CM

## 2022-02-04 PROCEDURE — 0502F SUBSEQUENT PRENATAL CARE: CPT | Performed by: STUDENT IN AN ORGANIZED HEALTH CARE EDUCATION/TRAINING PROGRAM

## 2022-02-04 NOTE — PROGRESS NOTES
[address] [facility]  [facility] - [address] [facility] [address]  Dept: [phone]  Dept Fax: [phone]  22      TELEHEALTH VIDEO PRENATAL VISIT    This visit was completed via MyChart video due to the restrictions of the COVID-19 pandemic. All issues as below were discussed and addressed but no physical exam was performed unless allowed by visual confirmation on MyChart video. Reviewed that if it was felt that the patient should be evaluated in clinic then she would be directed there. The patient verbally consented to visit. Shira De Oliveira is a 28 y.o. female  at 42w2d    Subjective: The patient was seen and evaluated. Reports Positive fetal movements. She denies abdominal pain, vaginal bleeding and leakage of fluid. Denies headache, vision changes, nausea, vomiting, chest pain, shortness of breath, RUQ pain, or increase in swelling. Signs and symptoms of  labor as well as labor were reviewed. Dates were reviewed with the patient. Estimated Date of Delivery: 22          The patient was instructed on fetal kick counts and was given a kick sheet to complete every 8 hours. She was instructed that the baby should move at a minimum of ten times within one hour after a meal. The patient was instructed to lay down on her left side twenty minutes after eating and count movements for up to one hour with a target value of ten movements. She was instructed to notify the office if she did not make that target after two attempts or if after any attempt there was less than four movements. The patient admits to that the targets have been made. The patient already received the T-Dap Vaccine (27-36 weeks) this pregnancy. The patient declined the influenza vaccine this year.     The problem list reflects the active issues addressed during today's visit      REVIEW OF SYSTEMS:     Constitutional: negative fever, negative chills  HEENT: negative visual disturbances, negative headaches  Respiratory: negative dyspnea, negative cough  Cardiovascular: negative chest pain,  negative palpitations  Gastrointestinal: negative Abdominal pain, negative RUQ pain, negative N/V/D, negative constipation  Genitourinary: negative dysuria, negative vaginal discharge  Dermatological: negative rash, negative wounds  Hematologic: negative bleeding/clotting disorder  Immunologic: negative recent illness, negative recent sick contact, negative allergic reactions  Lymphatic: negative lymph nodes  Musculoskeletal: negative back pain, negative myalgias, negative arthralgias  Neurological:  negative dizziness, negative weakness  Behavior/Psych: negative depression, negative anxiety    VITALS:  Vitals could not be obtained due to virtual visit  Adequate fetal movement per patient. Physical Exam: (performed to the best of my ability via webcam)  General Appearance: Appears healthy. Alert; in no acute distress. Pleasant. HEENT: normocephalic and atraumatic  Respiratory: Normal respiratory rate without signs of respiratory distress    Musculoskeletal: no gross abnormalities  Psych:  oriented to time, place and person, mood and affect are within normal limits     Labs:  Group Beta Strep collection was done. Sensitivities for clindamycin and erythromycin were not ordered. The patient was found to be GBS: negative      Assessment & Plan:  Iraida Torres is a 28 y.o. female  at [facility]   - The ACIP recommended pregnant patients be included in phase 1C of vaccine distribution. This decision is supported by [facility] and ACOG. As of 2021, there have been over 30,000 pregnant patients included in the V-safe post COVID vaccination safety . Most (73%) reports to VAERS among pregnant women involved non-pregnancyspecific adverse events (e.g., local and systemic reactions).  Miscarriage was the most frequently reported pregnancy-specific adverse event to [facility]; numbers are within the known background rates based on presumed COVID-19 vaccine doses administered to pregnant women. No unexpected pregnancy or infant outcomes have been observed related to  COVID-19 vaccination during pregnancy. Recommended patient proceed with vaccination. Patient Active Problem List    Diagnosis Date Noted    Elevated 1 hr, normal 3 hr GTT 01/26/2022    FH: spina bifida 08/17/2021     MGF spina bifida:  -scheduled for NT screening  -POC AFP 16-20 weeks  Anatomy US normal       Return in about 1 week (around 2/11/2022) for CHRISTIAN. All questions answered and patient vocalized understanding. She is grateful that she did not have to leave her home quarantine for this visit. Due to this being a TeleHealth encounter (During EYADY-71 public health emergency), evaluation of the following organ systems was limited: Vitals/Constitutional/EENT/Resp/CV/GI//MS/Neuro/Skin/Heme-Lymph-Imm. Pursuant to the emergency declaration under the [address] [place] [address] authority and the [employer] Act, this Virtual Visit was conducted with patient's (and/or legal guardian's) consent, to reduce the patient's risk of exposure to COVID-19 and provide necessary medical care. The patient (and/or legal guardian) has also been advised to contact this office for worsening conditions or problems, and seek emergency medical treatment and/or call 911 if deemed necessary. Services were provided through a video synchronous discussion virtually to substitute for in-person clinic visit. Patient was located at home and provider at her office in [place], [place]. Spent 15 minutes with patient face to face during video visit. More than 50% of this time was spent in counseling and coordination of care.        Ishmael Steele, [address] [facility]   2/4/2022, 9:39 AM

## 2022-02-10 ENCOUNTER — ROUTINE PRENATAL (OUTPATIENT)
Dept: OBGYN CLINIC | Age: 32
End: 2022-02-10

## 2022-02-10 ENCOUNTER — HOSPITAL ENCOUNTER (INPATIENT)
Age: 32
LOS: 4 days | Discharge: HOME | End: 2022-02-14
Attending: STUDENT IN AN ORGANIZED HEALTH CARE EDUCATION/TRAINING PROGRAM | Admitting: STUDENT IN AN ORGANIZED HEALTH CARE EDUCATION/TRAINING PROGRAM
Payer: COMMERCIAL

## 2022-02-10 VITALS
HEART RATE: 89 BPM | SYSTOLIC BLOOD PRESSURE: 126 MMHG | BODY MASS INDEX: 36.88 KG/M2 | HEIGHT: 67 IN | DIASTOLIC BLOOD PRESSURE: 74 MMHG | WEIGHT: 235 LBS

## 2022-02-10 DIAGNOSIS — R03.0 ELEVATED BP WITHOUT DIAGNOSIS OF HYPERTENSION: ICD-10-CM

## 2022-02-10 DIAGNOSIS — Z82.79 FH: SPINA BIFIDA: ICD-10-CM

## 2022-02-10 DIAGNOSIS — Z3A.38 38 WEEKS GESTATION OF PREGNANCY: Primary | ICD-10-CM

## 2022-02-10 PROBLEM — O13.9 GESTATIONAL HYPERTENSION: Status: ACTIVE | Noted: 2022-02-10

## 2022-02-10 PROBLEM — O09.93 HRP (HIGH RISK PREGNANCY), THIRD TRIMESTER: Status: ACTIVE | Noted: 2022-02-10

## 2022-02-10 PROBLEM — O16.3 ELEVATED BLOOD PRESSURE AFFECTING PREGNANCY IN THIRD TRIMESTER, ANTEPARTUM: Status: ACTIVE | Noted: 2022-02-10

## 2022-02-10 LAB
ABO/RH: NORMAL
ABSOLUTE EOS #: 0 K/UL (ref 0–0.4)
ABSOLUTE IMMATURE GRANULOCYTE: 0.14 K/UL (ref 0–0.3)
ABSOLUTE LYMPH #: 2.47 K/UL (ref 1–4.8)
ABSOLUTE MONO #: 0.55 K/UL (ref 0.1–0.8)
ALBUMIN SERPL-MCNC: 3.6 G/DL (ref 3.5–5.2)
ALBUMIN/GLOBULIN RATIO: 1.1 (ref 1–2.5)
ALP BLD-CCNC: 141 U/L (ref 35–104)
ALT SERPL-CCNC: 19 U/L (ref 5–33)
AMPHETAMINE SCREEN URINE: NEGATIVE
ANION GAP SERPL CALCULATED.3IONS-SCNC: 14 MMOL/L (ref 9–17)
ANTIBODY SCREEN: NEGATIVE
ARM BAND NUMBER: NORMAL
AST SERPL-CCNC: 16 U/L
BARBITURATE SCREEN URINE: NEGATIVE
BASOPHILS # BLD: 0 % (ref 0–2)
BASOPHILS ABSOLUTE: 0 K/UL (ref 0–0.2)
BENZODIAZEPINE SCREEN, URINE: NEGATIVE
BILIRUB SERPL-MCNC: 0.18 MG/DL (ref 0.3–1.2)
BUN BLDV-MCNC: 5 MG/DL (ref 6–20)
BUN/CREAT BLD: ABNORMAL (ref 9–20)
BUPRENORPHINE URINE: NORMAL
CALCIUM SERPL-MCNC: 9 MG/DL (ref 8.6–10.4)
CANNABINOID SCREEN URINE: NEGATIVE
CHLORIDE BLD-SCNC: 99 MMOL/L (ref 98–107)
CO2: 20 MMOL/L (ref 20–31)
COCAINE METABOLITE, URINE: NEGATIVE
CREAT SERPL-MCNC: 0.44 MG/DL (ref 0.5–0.9)
CREATININE URINE: 44 MG/DL (ref 28–217)
DIFFERENTIAL TYPE: ABNORMAL
EOSINOPHILS RELATIVE PERCENT: 0 % (ref 1–4)
EXPIRATION DATE: NORMAL
GFR AFRICAN AMERICAN: >60 ML/MIN
GFR NON-AFRICAN AMERICAN: >60 ML/MIN
GFR SERPL CREATININE-BSD FRML MDRD: ABNORMAL ML/MIN/{1.73_M2}
GFR SERPL CREATININE-BSD FRML MDRD: ABNORMAL ML/MIN/{1.73_M2}
GLUCOSE BLD-MCNC: 110 MG/DL (ref 70–99)
HCT VFR BLD CALC: 30.2 % (ref 36.3–47.1)
HEMOGLOBIN: 9.7 G/DL (ref 11.9–15.1)
IMMATURE GRANULOCYTES: 1 %
LYMPHOCYTES # BLD: 18 % (ref 24–44)
MCH RBC QN AUTO: 24.9 PG (ref 25.2–33.5)
MCHC RBC AUTO-ENTMCNC: 32.1 G/DL (ref 28.4–34.8)
MCV RBC AUTO: 77.4 FL (ref 82.6–102.9)
MDMA URINE: NORMAL
METHADONE SCREEN, URINE: NEGATIVE
METHAMPHETAMINE, URINE: NORMAL
MONOCYTES # BLD: 4 % (ref 1–7)
MORPHOLOGY: ABNORMAL
NRBC AUTOMATED: 0 PER 100 WBC
OPIATES, URINE: NEGATIVE
OXYCODONE SCREEN URINE: NEGATIVE
PDW BLD-RTO: 14 % (ref 11.8–14.4)
PHENCYCLIDINE, URINE: NEGATIVE
PLATELET # BLD: 208 K/UL (ref 138–453)
PLATELET ESTIMATE: ABNORMAL
PMV BLD AUTO: 12.3 FL (ref 8.1–13.5)
POTASSIUM SERPL-SCNC: 3.9 MMOL/L (ref 3.7–5.3)
PROPOXYPHENE, URINE: NORMAL
RBC # BLD: 3.9 M/UL (ref 3.95–5.11)
RBC # BLD: ABNORMAL 10*6/UL
SARS-COV-2, RAPID: NOT DETECTED
SEG NEUTROPHILS: 77 % (ref 36–66)
SEGMENTED NEUTROPHILS ABSOLUTE COUNT: 10.54 K/UL (ref 1.8–7.7)
SODIUM BLD-SCNC: 133 MMOL/L (ref 135–144)
SPECIMEN DESCRIPTION: NORMAL
T. PALLIDUM, IGG: NONREACTIVE
TEST INFORMATION: NORMAL
TOTAL PROTEIN, URINE: 6 MG/DL
TOTAL PROTEIN: 6.8 G/DL (ref 6.4–8.3)
TRICYCLIC ANTIDEPRESSANTS, UR: NORMAL
URINE TOTAL PROTEIN CREATININE RATIO: 0.14 (ref 0–0.2)
WBC # BLD: 13.7 K/UL (ref 3.5–11.3)
WBC # BLD: ABNORMAL 10*3/UL

## 2022-02-10 PROCEDURE — 80053 COMPREHEN METABOLIC PANEL: CPT

## 2022-02-10 PROCEDURE — 2580000003 HC RX 258: Performed by: STUDENT IN AN ORGANIZED HEALTH CARE EDUCATION/TRAINING PROGRAM

## 2022-02-10 PROCEDURE — 86850 RBC ANTIBODY SCREEN: CPT

## 2022-02-10 PROCEDURE — 86780 TREPONEMA PALLIDUM: CPT

## 2022-02-10 PROCEDURE — 6370000000 HC RX 637 (ALT 250 FOR IP)

## 2022-02-10 PROCEDURE — 36415 COLL VENOUS BLD VENIPUNCTURE: CPT

## 2022-02-10 PROCEDURE — 85025 COMPLETE CBC W/AUTO DIFF WBC: CPT

## 2022-02-10 PROCEDURE — 3E0P7VZ INTRODUCTION OF HORMONE INTO FEMALE REPRODUCTIVE, VIA NATURAL OR ARTIFICIAL OPENING: ICD-10-PCS | Performed by: OBSTETRICS & GYNECOLOGY

## 2022-02-10 PROCEDURE — 1220000000 HC SEMI PRIVATE OB R&B

## 2022-02-10 PROCEDURE — 3E033VJ INTRODUCTION OF OTHER HORMONE INTO PERIPHERAL VEIN, PERCUTANEOUS APPROACH: ICD-10-PCS | Performed by: OBSTETRICS & GYNECOLOGY

## 2022-02-10 PROCEDURE — 82570 ASSAY OF URINE CREATININE: CPT

## 2022-02-10 PROCEDURE — 0502F SUBSEQUENT PRENATAL CARE: CPT | Performed by: STUDENT IN AN ORGANIZED HEALTH CARE EDUCATION/TRAINING PROGRAM

## 2022-02-10 PROCEDURE — 84156 ASSAY OF PROTEIN URINE: CPT

## 2022-02-10 PROCEDURE — 87635 SARS-COV-2 COVID-19 AMP PRB: CPT

## 2022-02-10 PROCEDURE — 6370000000 HC RX 637 (ALT 250 FOR IP): Performed by: STUDENT IN AN ORGANIZED HEALTH CARE EDUCATION/TRAINING PROGRAM

## 2022-02-10 PROCEDURE — 86901 BLOOD TYPING SEROLOGIC RH(D): CPT

## 2022-02-10 PROCEDURE — 86900 BLOOD TYPING SEROLOGIC ABO: CPT

## 2022-02-10 PROCEDURE — 80307 DRUG TEST PRSMV CHEM ANLYZR: CPT

## 2022-02-10 RX ORDER — ONDANSETRON 4 MG/1
4 TABLET, ORALLY DISINTEGRATING ORAL EVERY 8 HOURS PRN
Status: DISCONTINUED | OUTPATIENT
Start: 2022-02-10 | End: 2022-02-10

## 2022-02-10 RX ORDER — SODIUM CHLORIDE 0.9 % (FLUSH) 0.9 %
5-40 SYRINGE (ML) INJECTION PRN
Status: DISCONTINUED | OUTPATIENT
Start: 2022-02-10 | End: 2022-02-12 | Stop reason: HOSPADM

## 2022-02-10 RX ORDER — SODIUM CHLORIDE, SODIUM LACTATE, POTASSIUM CHLORIDE, AND CALCIUM CHLORIDE .6; .31; .03; .02 G/100ML; G/100ML; G/100ML; G/100ML
500 INJECTION, SOLUTION INTRAVENOUS PRN
Status: DISCONTINUED | OUTPATIENT
Start: 2022-02-10 | End: 2022-02-12 | Stop reason: HOSPADM

## 2022-02-10 RX ORDER — SODIUM CHLORIDE, SODIUM LACTATE, POTASSIUM CHLORIDE, AND CALCIUM CHLORIDE .6; .31; .03; .02 G/100ML; G/100ML; G/100ML; G/100ML
1000 INJECTION, SOLUTION INTRAVENOUS PRN
Status: DISCONTINUED | OUTPATIENT
Start: 2022-02-10 | End: 2022-02-12 | Stop reason: HOSPADM

## 2022-02-10 RX ORDER — SODIUM CHLORIDE, SODIUM LACTATE, POTASSIUM CHLORIDE, CALCIUM CHLORIDE 600; 310; 30; 20 MG/100ML; MG/100ML; MG/100ML; MG/100ML
INJECTION, SOLUTION INTRAVENOUS CONTINUOUS
Status: DISCONTINUED | OUTPATIENT
Start: 2022-02-10 | End: 2022-02-13

## 2022-02-10 RX ORDER — ACETAMINOPHEN 500 MG
1000 TABLET ORAL EVERY 6 HOURS PRN
Status: DISCONTINUED | OUTPATIENT
Start: 2022-02-10 | End: 2022-02-12 | Stop reason: HOSPADM

## 2022-02-10 RX ORDER — ONDANSETRON 2 MG/ML
4 INJECTION INTRAMUSCULAR; INTRAVENOUS EVERY 6 HOURS PRN
Status: DISCONTINUED | OUTPATIENT
Start: 2022-02-10 | End: 2022-02-10

## 2022-02-10 RX ORDER — LIDOCAINE HYDROCHLORIDE 10 MG/ML
30 INJECTION, SOLUTION EPIDURAL; INFILTRATION; INTRACAUDAL; PERINEURAL PRN
Status: DISCONTINUED | OUTPATIENT
Start: 2022-02-10 | End: 2022-02-12 | Stop reason: HOSPADM

## 2022-02-10 RX ORDER — SODIUM CHLORIDE 0.9 % (FLUSH) 0.9 %
5-40 SYRINGE (ML) INJECTION EVERY 12 HOURS SCHEDULED
Status: DISCONTINUED | OUTPATIENT
Start: 2022-02-10 | End: 2022-02-12 | Stop reason: HOSPADM

## 2022-02-10 RX ORDER — CALCIUM CARBONATE 200(500)MG
500 TABLET,CHEWABLE ORAL 3 TIMES DAILY PRN
Status: DISCONTINUED | OUTPATIENT
Start: 2022-02-10 | End: 2022-02-10

## 2022-02-10 RX ORDER — ACETAMINOPHEN 325 MG/1
650 TABLET ORAL EVERY 4 HOURS PRN
Status: DISCONTINUED | OUTPATIENT
Start: 2022-02-10 | End: 2022-02-10

## 2022-02-10 RX ORDER — SODIUM CHLORIDE 9 MG/ML
25 INJECTION, SOLUTION INTRAVENOUS PRN
Status: DISCONTINUED | OUTPATIENT
Start: 2022-02-10 | End: 2022-02-12 | Stop reason: HOSPADM

## 2022-02-10 RX ORDER — ONDANSETRON 2 MG/ML
4 INJECTION INTRAMUSCULAR; INTRAVENOUS EVERY 6 HOURS PRN
Status: DISCONTINUED | OUTPATIENT
Start: 2022-02-10 | End: 2022-02-12 | Stop reason: HOSPADM

## 2022-02-10 RX ORDER — DIPHENHYDRAMINE HCL 25 MG
25 TABLET ORAL EVERY 4 HOURS PRN
Status: DISCONTINUED | OUTPATIENT
Start: 2022-02-10 | End: 2022-02-12 | Stop reason: HOSPADM

## 2022-02-10 RX ADMIN — Medication 25 MCG: at 22:11

## 2022-02-10 RX ADMIN — SODIUM CHLORIDE, POTASSIUM CHLORIDE, SODIUM LACTATE AND CALCIUM CHLORIDE: 600; 310; 30; 20 INJECTION, SOLUTION INTRAVENOUS at 16:44

## 2022-02-10 RX ADMIN — Medication 25 MCG: at 17:45

## 2022-02-10 NOTE — PROGRESS NOTES
Prenatal Visit    Ijeoma Zaldivar is a 28 y.o. female  at 43w4d    The patient was seen and evaluated. Reports positive fetal movements. She denies headache, vision changes, RUQ pain, contractions, vaginal bleeding and leakage of fluid. The patient was instructed on fetal kick counts and was given a kick sheet to complete every 8 hours. She was instructed that the baby should move at a minimum of ten times within one hour after a meal. The patient was instructed to lay down on her left side twenty minutes after eating and count movements for up to one hour with a target value of ten movements. She was instructed to notify the office if she did not make that target after two attempts or if after any attempt there was less than four movements. The patient admits to that the targets have been made. The patient already received the T-Dap Vaccine (27-36 weeks) this pregnancy. The patient declined the influenza vaccine this year. The problem list reflects the active issues addressed during today's visit. Allergies:  Latex    Vitals:  BP: 126/74  Weight: 235 lb (106.6 kg)  Pulse: 89  Fundal Height (cm): 38 cm  Fetal Heart Rate: 140     Vitals:    02/10/22 0915 02/10/22 0941   BP: (!) 140/83 126/74   Site: Left Upper Arm Left Upper Arm   Position: Sitting Sitting   Cuff Size: Large Adult Large Adult   Pulse: 89    Weight: 235 lb (106.6 kg)    Height: 5' 7\" (1.702 m)        Physical Exam:  SVE: N/A    Labs:  Group Beta Strep collection was done. Sensitivities for clindamycin and erythromycin were not ordered. The patient was found to be GBS: negative    Assessment & Plan:  Ijeoma Zaldivar is a 28 y.o. female  [facility] @ 0600   - Elevated BP today. Sent to L&D for Pre-E work up   - The ACIP recommended pregnant patients be included in phase 1C of vaccine distribution. This decision is supported by [facility] and ACOG.  As of 2021, there have been over 30,000 pregnant patients included in the V-safe post COVID vaccination safety . Most (73%) reports to VAERS among pregnant women involved non-pregnancyspecific adverse events (e.g., local and systemic reactions). Miscarriage was the most frequently reported pregnancy-specific adverse event to VAERS; numbers are within the known background rates based on presumed COVID-19 vaccine doses administered to pregnant women. No unexpected pregnancy or infant outcomes have been observed related to  COVID-19 vaccination during pregnancy. Recommended patient proceed with vaccination. Patient Active Problem List    Diagnosis Date Noted    Elevated BP without diagnosis of hypertension 02/10/2022     140/83 on 2/10/22   Sent to L&D for Pre-E workup      Elevated 1 hr, normal 3 hr GTT 01/26/2022    FH: spina bifida 08/17/2021     MGF spina bifida:  -scheduled for NT screening  -POC AFP 16-20 weeks  Anatomy US normal       Return in about 2 weeks (around 2/24/2022) for PP Visit.     DO Yandy Garcia Ob/Gyn   2/10/2022, 9:42 AM

## 2022-02-10 NOTE — PROGRESS NOTES
OB/GYN Interval Note    Patient seen and evaluated. Her SVE is FT/thick/high. Will plan on starting with Cytotec 25 mg PO. Will reassess in four hours. Patient continues to deny any s/s of pre E.    Vitals:    02/10/22 1247 02/10/22 1317 02/10/22 1358 02/10/22 1528   BP: (!) 141/65 134/73 136/75 (!) 141/70   Pulse: 102 102 98 95   Resp: 18 18 18 18   Temp:       TempSrc:           Senior resident and attending updated and in agreement with plan.     Marily Logan DO, PGY-2  Ob/Gyn Resident  [address]  02/10/22

## 2022-02-10 NOTE — DISCHARGE SUMMARY
Obstetric Discharge Summary  [address] [facility]    Patient Name: Amita Masters  Patient : 1990  Primary Care Physician: No primary care provider on file. Admit Date: 2/10/2022    Principal Diagnosis: IUP at 38w1d, admitted for induction of labor 2/2 gestational hypertension. Her pregnancy has been complicated by:   Patient Active Problem List   Diagnosis    FH: spina bifida    Elevated 1 hr, normal 3 hr GTT    Elevated blood pressure affecting pregnancy in third trimester, antepartum    HRP (high risk pregnancy), third trimester    Gestational hypertension (G1)     22 F Apg 8/9 Wt 7#12       Infection Present?: No  Hospital Acquired: n/a    Surgical Operations & Procedures:  Analgesia: epidural  Delivery Type: Spontaneous Vaginal Delivery: See Labor and Delivery Summary   Laceration(s): Right vaginal laceration repaired with 3-0 Vicryl    Consultations: Anesthesia    Pertinent Findings & Procedures:   Amita Masters is a 33 y.o. female  at 38w1d admitted for induction of labor 2/2 gestational hypertension; received cytotec 25 PO x5, 25 PV x1, cervidil x1, SROM clear fluid, pitocin, Nitrous, and epidural.     She delivered by spontaneous vaginal a Live Born infant on 22. Information for the patient's :  Shearon Stank Girl Claudean Kezia [[id number]]   female   Birth Weight: 7 lb 12.2 oz (3.52 kg)       Apgars: 8 at 1 minute and 9 at 5 minutes. Postpartum course: normal.  Pt met criteria for gHTN and PreE labs were WNL x1, P/C 0.14.     Course of patient: complicated by gestational hypertension (newly dx)    Discharge to: Home    Readmission planned: no     Recommendations on Discharge:     Medications:      Medication List      START taking these medications    docusate sodium 100 MG capsule  Commonly known as: COLACE  Take 1 capsule by mouth 2 times daily     ferrous sulfate 325 (65 Fe) MG EC tablet  Commonly known as: Fe Tabs  Take 1 tablet by mouth 2 times daily     ibuprofen 600 MG tablet  Commonly known as: ADVIL;MOTRIN  Take 1 tablet by mouth every 6 hours as needed for Pain        CHANGE how you take these medications    omeprazole 20 MG tablet  Commonly known as: PriLOSEC OTC  Take 1 tablet by mouth daily  What changed: additional instructions        CONTINUE taking these medications    PRENATAL PO        STOP taking these medications    aspirin 81 MG chewable tablet           Where to Get Your Medications      You can get these medications from any pharmacy    Bring a paper prescription for each of these medications  · docusate sodium 100 MG capsule  · ferrous sulfate 325 (65 Fe) MG EC tablet  · ibuprofen 600 MG tablet           Activity: pelvic rest x 6 weeks,  no lifting greater than 15 lbs  Diet: regular diet  Follow up: 1 week for BP check    Condition on discharge: stable    Discharge date: 2/14/22    Mayra Finney DO  Ob/Gyn Resident    Comments:  Home care and follow-up care were reviewed. Pelvic rest, and birth control were reviewed. Signs and symptoms of mastitis and post partum depression were reviewed. The patient is to notify her physician if any of these occur. The patient was counseled on secondary smoke risks and the increased risk of sudden infant death syndrome and respiratory problems to her baby with exposure. She was counseled on various alternate recommendations to decrease the exposure to secondary smoke to her children.

## 2022-02-10 NOTE — H&P
OBSTETRICAL HISTORY Arturo Gaviria    Date: 2/10/2022       Time: 11:08 AM   Patient Name: Abe Turcios     Patient : 1990  Room/Bed: [place] [address]    Admission Date/Time: 2/10/2022 10:51 AM      CC: elevated blood pressure      HPI: Abe Turcios is a 28 y.o. Estanciatheresa Nicke at 38w1d who presents from the office with an elevated blood pressure. She was sent over for preeclampsia work up. She has no s/s of preE and has no complaints this morning. States she has noticed some increased lower extremity swelling but it is not bothering her. Patient denies any headache, visual changes, difficulty breathing, RUQ pain, N/V, F/C, and pain/swelling in lower extremities. Denies any dysuria or vaginal discharge. The patient reports fetal movement is present, denies contractions, denies loss of fluid, denies vaginal bleeding. DATING:  LMP: Patient's last menstrual period was 2021 (exact date).   Estimated Date of Delivery: 22   Based on: LMP    PREGNANCY RISK FACTORS:  Patient Active Problem List   Diagnosis    FH: spina bifida    Elevated 1 hr, normal 3 hr GTT    Elevated BP without diagnosis of hypertension    Elevated blood pressure affecting pregnancy in third trimester, antepartum      Steroids Given In This Pregnancy:  no     REVIEW OF SYSTEMS:    Constitutional: negative fever, negative chills, negative weight changes   HEENT: negative visual disturbances, negative headaches, negative dizziness  Breast: negative breast abnormalities, negative breast lumps, negative nipple discharge  Respiratory: negative dyspnea, negative cough, negative SOB  Cardiovascular: negative chest pain,  negative palpitations, negative arrhythmia, negative syncope   Gastrointestinal: negative abdominal pain, negative RUQ pain, negative N/V, negative diarrhea, negative constipation, negative bowel changes, negative heartburn   Genitourinary: negative dysuria, negative hematuria, negative urinary incontinence, negative vaginal discharge  Dermatological: negative rash, negative pruritis, negative mole changes  Hematologic: negative bruising  Immunologic/Lymphatic: negative recent illness, negative recent sick contact  Musculoskeletal: negative back pain, negative myalgias, negative arthralgias,pos lower extremity edema   Neurological:  negative dizziness, negative migraines, negative seizures, negative weakness  Behavior/Psych: negative depression, negative anxiety, negative SI, negative HI    OBSTETRICAL HISTORY:   OB History    Para Term  AB Living   1 0 0 0 0 0   SAB IAB Ectopic Molar Multiple Live Births   0 0 0 0 0 0      # Outcome Date GA Lbr Kd/2nd Weight Sex Delivery Anes PTL Lv   1 Current               Obstetric Comments   1 step son        PAST MEDICAL HISTORY:   has a past medical history of Atypical squamous cells of undetermined significance (ASCUS) on Papanicolaou smear of cervix. PAST SURGICAL HISTORY:   has a past surgical history that includes Tonsillectomy and [place] tooth extraction (2019). ALLERGIES:  is allergic to latex. MEDICATIONS:  Prior to Admission medications    Medication Sig Start Date End Date Taking?  Authorizing Provider   omeprazole (PRILOSEC OTC) 20 MG tablet Take 1 tablet by mouth daily  Patient taking differently: Take 20 mg by mouth daily hasnt had to use, tums have helped 22   Ishmael Steele, DO   aspirin 81 MG chewable tablet Take 81 mg by mouth daily    Historical Provider, MD   Prenatal Vit-Fe Fumarate-FA (PRENATAL PO) Take by mouth    Historical Provider, MD       FAMILY HISTORY:  family history includes Allergies in her brother; Asthma in her brother; Breast Cancer (age of onset: 77) in her paternal grandmother; Cancer (age of onset: 76) in her paternal grandmother; Colon Cancer (age of onset: 61) in her maternal grandfather and maternal grandmother; Diabetes (age of onset: 61) in her maternal grandfather and maternal grandmother; Heart Attack in her paternal grandfather; Heart Disease in her father and maternal grandfather; Hypertension in her maternal grandfather, maternal grandmother, and paternal grandfather; Hypertension (age of onset: 39) in her father; Kidney Disease in her maternal grandmother; No Known Problems in her mother. SOCIAL HISTORY:   reports that she has quit smoking. She has never used smokeless tobacco. She reports that she does not drink alcohol and does not use drugs. VITALS:  Vitals:    02/10/22 1112   BP: (!) 149/75   Pulse: 93   Resp: 18   Temp: 98.1 °F (36.7 °C)   TempSrc: Oral     PHYSICAL EXAM:  Fetal Heart Monitor:  Baseline Heart Rate 140, moderate variability, present accelerations, absent decelerations  [place]: contractions, irregular, every 7 minutes    General appearance:  no apparent distress, alert and cooperative  HEENT: head atraumatic, normocephalic, trachea midline, moist mucous membranes   Neurologic:  oriented, normal speech, no focal findings or movement disorder noted  Lungs:  no increased work of breathing, good air exchange, clear to auscultation bilaterally, no crackles or wheezing  Heart:  regular rate and rhythm   Abdomen:  soft, gravid, non-tender on palpation, no right upper quadrant tenderness, uterus non-tender, no signs of abruption and no signs of chorioamnionitis  Extremities:  no calf tenderness bilaterally, +1 pitting edema on bilateral lower extremities   Musculoskeletal: no gross abnormalities, range of motion appropriate for age   Psychiatric: mood appropriate, normal affect      PRENATAL LAB RESULTS:   Blood Type/Rh: A pos  Antibody Screen: negative  Hemoglobin, Hematocrit, Platelets: 32.7 / 39.1 / 248  Rubella: immune  T.  Pallidum, IgG: non-reactive   Hepatitis B Surface Antigen: non-reactive   Hep C Antibody: non reactive   HIV: non-reactive   Sickle Cell Screen: not available  Gonorrhea: negative  Chlamydia: negative  Urine culture: negative, date: 7/22/21    1 hour Glucose Tolerance Test: 137  3 hour Glucose Tolerance Test: Fastin; 1 hour: 160; 2 hour  130; 3 hour: 111    Group B Strep: negative 22  Cystic Fibrosis Screen: negative  First Trimester Screen: not available  MSAFP/Multiple Markers: normal  Non-Invasive Prenatal Testing: did not complete   Anatomy US: anterior, normal cord insertion, 3VC, female     ASSESSMENT & PLAN:  Kaye Zhang is a 28 y.o. female  at 38w1d IUP   - GBS negative / Rh positive / R immune   - No indicated for GBS prophylaxis    Elevate Blood Pressure    - VSS   - Patient had one elevated blood pressure in the office today during her routine OB visit. Her blood pressure is elevated on arrival as well    - First elevated blood pressure @ 0915   - Denies any s/s of preeclampsia    - Preeclampsia labs with P/C drawn    - Will await lab work and trend blood pressures every 30 minutes to see if she meets criteria for gestational HTN or preE    Abnormal Glucose Tolerance Test   - Elevated 1 hr GTT with normal 3 hr GTT    Family Hx Spina Bifida   - No evidence of spina bifida on anatomy scan    - AFP normal    - Maternal grandfather     Hx Abnormal Pap Smear    - 22 normal pap smear with no atypia    - 19 ASCUS with +HPV    Patient Active Problem List    Diagnosis Date Noted    Elevated BP without diagnosis of hypertension 02/10/2022     140/83 on 2/10/22   Sent to L&D for Pre-E workup      Elevated blood pressure affecting pregnancy in third trimester, antepartum 02/10/2022    Elevated 1 hr, normal 3 hr GTT 2022    FH: spina bifida 2021     MGF spina bifida:  -scheduled for NT screening  -POC AFP 16-20 weeks  Anatomy US normal         Plan discussed with Dr. Asya Brown, who is agreeable. Risks, benefits, alternatives and possible complications have been discussed in detail with the patient. Admission, and post admission procedures and expectations were discussed in detail.  All questions were answered.     Attending's Name: Dr. Hermes Flores DO  Ob/Gyn Resident  2/10/2022, 11:08 AM

## 2022-02-10 NOTE — PROGRESS NOTES
Ob/Gyn Resident Progress Note:    Patient met criteria for gestational hypertension based on multiple elevated blood pressures at least 4 hours apart. No severe range blood pressures. Patient continues to deny any signs or symptoms of preeclampsia. We will plan to admit the patient to labor and delivery for induction of labor secondary to new diagnosis of gestational hypertension. Patient in agreement with plan. patient signed out to Dr. Lewis Mckeon and to be admitted under her. Dr. Lewis Mckeon in agreement with plan. Will obtain remainder of admission labs and develop induction plan. Discussed plan with senior resident.     Adama Mendieta DO   OB/GYN Resident, PGY2  [facility]  2/10/2022 3:58 PM

## 2022-02-11 PROBLEM — R03.0 ELEVATED BP WITHOUT DIAGNOSIS OF HYPERTENSION: Status: RESOLVED | Noted: 2022-02-10 | Resolved: 2022-02-11

## 2022-02-11 PROCEDURE — 1220000000 HC SEMI PRIVATE OB R&B

## 2022-02-11 PROCEDURE — 6370000000 HC RX 637 (ALT 250 FOR IP)

## 2022-02-11 PROCEDURE — 99024 POSTOP FOLLOW-UP VISIT: CPT | Performed by: OBSTETRICS & GYNECOLOGY

## 2022-02-11 PROCEDURE — 2580000003 HC RX 258: Performed by: STUDENT IN AN ORGANIZED HEALTH CARE EDUCATION/TRAINING PROGRAM

## 2022-02-11 PROCEDURE — 59200 INSERT CERVICAL DILATOR: CPT

## 2022-02-11 PROCEDURE — 6370000000 HC RX 637 (ALT 250 FOR IP): Performed by: STUDENT IN AN ORGANIZED HEALTH CARE EDUCATION/TRAINING PROGRAM

## 2022-02-11 RX ORDER — ACYCLOVIR 200 MG/1
400 CAPSULE ORAL 3 TIMES DAILY
Status: DISCONTINUED | OUTPATIENT
Start: 2022-02-11 | End: 2022-02-12

## 2022-02-11 RX ADMIN — ACYCLOVIR 400 MG: 200 CAPSULE ORAL at 19:37

## 2022-02-11 RX ADMIN — Medication 25 MCG: at 07:04

## 2022-02-11 RX ADMIN — DINOPROSTONE 10 MG: 10 INSERT VAGINAL at 22:41

## 2022-02-11 RX ADMIN — SODIUM CHLORIDE, POTASSIUM CHLORIDE, SODIUM LACTATE AND CALCIUM CHLORIDE: 600; 310; 30; 20 INJECTION, SOLUTION INTRAVENOUS at 08:03

## 2022-02-11 RX ADMIN — Medication 25 MCG: at 02:33

## 2022-02-11 RX ADMIN — SODIUM CHLORIDE, POTASSIUM CHLORIDE, SODIUM LACTATE AND CALCIUM CHLORIDE: 600; 310; 30; 20 INJECTION, SOLUTION INTRAVENOUS at 00:48

## 2022-02-11 RX ADMIN — Medication 25 MCG: at 17:32

## 2022-02-11 RX ADMIN — Medication 25 MCG: at 12:35

## 2022-02-11 NOTE — PROGRESS NOTES
Labor Progress Note    Eyad Oleary is a 28 y.o. female  at 36w4d  The patient was seen and examined. Her pain is well controlled. She reports fetal movement is present, complains of contractions, denies loss of fluid, denies vaginal bleeding.        Vital Signs:  Vitals:    22 0612 22 0706 22 1101 22 1522   BP: 118/72 (!) 141/74 130/69 132/76   Pulse: 84 82 79 90   Resp:  16 16 18   Temp: 98.1 °F (36.7 °C) 97.4 °F (36.3 °C) 98.2 °F (36.8 °C) 98.2 °F (36.8 °C)   TempSrc: Oral Oral Oral Oral         FHT: 135, moderate variability, accelerations present, decelerations absent  Contractions: regular, every 5 minutes    Chaperone for Intimate Exam: Chaperone was present for entire exam, Chaperone Name: Kishor Yanes RN  Cervical Exam: deferred  Pitocin: @ 0 mu/min    Membranes: Intact  Scalp Electrode in place: absent  Intrauterine Pressure Catheter in Place: absent    Interventions: none    Assessment/Plan:  Eyad Oleary is a 28 y.o. female  at 36w4d admitted for IUP, IOL 2/2 gHTN   - GBS negative, No indication for GBS prophylaxis   - VSS, afebrile    - cEFM/TOCO- Cat 1 tracing regular contractions   - IVF NS @ 125 ml/hr   - S/p cytotec 25 PO x 5   - 25 PV vaginal cytotec ordered   - Patient is declining a min balloon at this time   - Continue to monitor        Attending updated and in agreement with plan    David Lew DO  Ob/Gyn Resident  2022, 5:59 PM

## 2022-02-11 NOTE — CARE COORDINATION
ANTEPARTUM NOTE    Elevated blood pressure affecting pregnancy in third trimester, antepartum [O16.3]  HRP (high risk pregnancy), third trimester [O09.93]    Junie was admitted to L&D on 2/10/2022 for elevated BP @ 38w1d for PreE work up. Found to meet criteria for gHTN and continued w/ IOL    OB GYN Provider: Dr. Jeremiah Ha    Will meet with patient after delivery to verify name/address/phone/insurance and discuss discharge planning. Anticipate DC home 2 nights after vaginal delivery or 4 nights after C/S delivery as long as hemodynamically stable.

## 2022-02-11 NOTE — PROGRESS NOTES
Labor Progress Note    Rianna Newberry is a 28 y.o. female  at 43w4d  The patient was seen and examined. Her pain is well controlled. She reports fetal movement is present, complains of contractions, denies loss of fluid, denies vaginal bleeding. Vital Signs:  Vitals:    02/10/22 1358 02/10/22 1528 02/10/22 1747 02/10/22 2208   BP: 136/75 (!) 141/70 127/76 (!) 144/78   Pulse: 98 95 95 78   Resp: 18 18  20   Temp:    98.8 °F (37.1 °C)   TempSrc:    Oral     FHT: 140, moderate variability, accelerations present, decelerations absent  Contractions: none    Chaperone for Intimate Exam: Chaperone was present for entire exam, Chaperone Name: Darleen Mora RN  Cervical Exam: fingertip cm dilated, 0 effaced, -3 station  Pitocin: @ 0 mu/min    Membranes: Intact  Scalp Electrode in place: absent  Intrauterine Pressure Catheter in Place: absent    Interventions: SVE    Assessment/Plan:  Rianna Newberry is a 28 y.o. female  at 38w1d admitted for IOL 2/ gHTN (new dx)   - GBS negative, No indication for GBS prophylaxis   - Afebrile   - cEFM/TOCO   - S/p Cytotec 25 PO x1.  Will order another dose   - Continue to monitor closely    Attending and senior resident updated and in agreement with plan    Gerard Matos MD  Ob/Gyn Resident  2/10/2022, 10:11 PM

## 2022-02-11 NOTE — PROGRESS NOTES
Labor Progress Note    Virginie Limon is a 28 y.o. female  at 36w4d  The patient was seen and examined. Her pain is well controlled. She reports fetal movement is present, complains of contractions, denies loss of fluid, denies vaginal bleeding. Vital Signs:  Vitals:    02/10/22 1528 02/10/22 1747 02/10/22 2208 22 0050   BP: (!) 141/70 127/76 (!) 144/78 (!) 126/56   Pulse: 95 95 78 79   Resp: 18  20    Temp:   98.8 °F (37.1 °C) 97.7 °F (36.5 °C)   TempSrc:   Oral Oral     FHT: 140, moderate variability, accelerations present, decelerations absent  Contractions: irregular, every 2-5 minutes    Chaperone for Intimate Exam: Chaperone was present for entire exam, Chaperone Name: Cornelius Pham RN  Cervical Exam: 1 cm dilated, 50 effaced, -2 station  Pitocin: @ 0 mu/min    Membranes: Intact  Scalp Electrode in place: absent  Intrauterine Pressure Catheter in Place: absent    Interventions: SVE    Assessment/Plan:  Virginie Limon is a 28 y.o. female  at 36w4d admitted for IOL 2/2 gHTN (new dx)   - GBS negative, No indication for GBS prophylaxis   - Afebrile   - cEFM/TOCO   - S/p Cytotec 25 PO x3.     - Discussed transcervical min balloon with patient and possibilities for premedication prior to placement. She states that she would like to think about it and will let us know what she decides   - Will order another dose of Cytotec 25 PO in the meantine   - Continue to monitor closely    Attending and senior resident updated and in agreement with plan    Jen Candelaria MD  Ob/Gyn Resident  2022, 6:15 AM          Attending Physician Statement  I have discussed the care of Virginie Limon, including pertinent history and exam findings,  with the resident. I have seen and examined the patient and the key elements of all parts of the encounter have been performed by me. I agree with the assessment, plan and orders as documented by the resident. [facility])    Patient seen and examined this morning. Agree with resident note. Pt got fourth dose of cytotec around 7am today. She is not currently interested in a min balloon, but pt aware that we may need to revisit that option depending on what her cervix does after this dose of cytotec. Pt plans on a a natural birth, but is open to options for pain control if she needs them. Continue expectant mgmt.     Delmy Coe, DO

## 2022-02-11 NOTE — PROGRESS NOTES
Labor Progress Note    Rianna Newberry is a 28 y.o. female  at 36w4d  The patient was seen and examined. Her pain is well controlled. She reports fetal movement is present, complains of contractions, denies loss of fluid, denies vaginal bleeding.        Vital Signs:  Vitals:    22 0612 22 0706 22 1101 22 1522   BP: 118/72 (!) 141/74 130/69 132/76   Pulse: 84 82 79 90   Resp:  16 16 18   Temp: 98.1 °F (36.7 °C) 97.4 °F (36.3 °C) 98.2 °F (36.8 °C) 98.2 °F (36.8 °C)   TempSrc: Oral Oral Oral Oral         FHT: 135, moderate variability, accelerations present, decelerations absent  Contractions: regular, every 5 minutes    Chaperone for Intimate Exam: Chaperone was present for entire exam, Chaperone Name: Mat Vela RN  Cervical Exam: 1 cm dilated, 50 effaced, -2 station  Pitocin: @ 0 mu/min    Membranes: Intact  Scalp Electrode in place: absent  Intrauterine Pressure Catheter in Place: absent    Interventions: none    Assessment/Plan:  Rianna Newberry is a 28 y.o. female  at 36w4d admitted for IUP, IOL 2/2 gHTN   - GBS negative, No indication for GBS prophylaxis   - VSS, afebrile    - cEFM/TOCO- Cat 1 tracing regular contractions   - IVF NS @ 125 ml/hr   - S/p cytotec 25 PO x 4   - Another cytotec ordered   - Patient is declining a min balloon at this time   - Continue to monitor        Attending updated and in agreement with plan    Ashwin Lea DO  Ob/Gyn Resident  2022, 5:55 PM

## 2022-02-12 ENCOUNTER — ANESTHESIA EVENT (OUTPATIENT)
Dept: LABOR AND DELIVERY | Age: 32
End: 2022-02-12
Payer: COMMERCIAL

## 2022-02-12 ENCOUNTER — ANESTHESIA (OUTPATIENT)
Dept: LABOR AND DELIVERY | Age: 32
End: 2022-02-12
Payer: COMMERCIAL

## 2022-02-12 PROCEDURE — 1220000000 HC SEMI PRIVATE OB R&B

## 2022-02-12 PROCEDURE — 2580000003 HC RX 258: Performed by: STUDENT IN AN ORGANIZED HEALTH CARE EDUCATION/TRAINING PROGRAM

## 2022-02-12 PROCEDURE — 96374 THER/PROPH/DIAG INJ IV PUSH: CPT

## 2022-02-12 PROCEDURE — 2500000003 HC RX 250 WO HCPCS: Performed by: NURSE ANESTHETIST, CERTIFIED REGISTERED

## 2022-02-12 PROCEDURE — 6370000000 HC RX 637 (ALT 250 FOR IP): Performed by: STUDENT IN AN ORGANIZED HEALTH CARE EDUCATION/TRAINING PROGRAM

## 2022-02-12 PROCEDURE — 0UQGXZZ REPAIR VAGINA, EXTERNAL APPROACH: ICD-10-PCS | Performed by: OBSTETRICS & GYNECOLOGY

## 2022-02-12 PROCEDURE — 7200000001 HC VAGINAL DELIVERY

## 2022-02-12 PROCEDURE — 6360000002 HC RX W HCPCS: Performed by: STUDENT IN AN ORGANIZED HEALTH CARE EDUCATION/TRAINING PROGRAM

## 2022-02-12 PROCEDURE — 3700000025 EPIDURAL BLOCK: Performed by: ANESTHESIOLOGY

## 2022-02-12 PROCEDURE — 6360000002 HC RX W HCPCS: Performed by: NURSE ANESTHETIST, CERTIFIED REGISTERED

## 2022-02-12 PROCEDURE — 6360000002 HC RX W HCPCS: Performed by: ANESTHESIOLOGY

## 2022-02-12 PROCEDURE — 88307 TISSUE EXAM BY PATHOLOGIST: CPT

## 2022-02-12 RX ORDER — ONDANSETRON 2 MG/ML
4 INJECTION INTRAMUSCULAR; INTRAVENOUS EVERY 4 HOURS PRN
Status: DISCONTINUED | OUTPATIENT
Start: 2022-02-12 | End: 2022-02-14 | Stop reason: HOSPADM

## 2022-02-12 RX ORDER — DOCUSATE SODIUM 100 MG/1
100 CAPSULE, LIQUID FILLED ORAL 2 TIMES DAILY
Qty: 60 CAPSULE | Refills: 0 | Status: SHIPPED | OUTPATIENT
Start: 2022-02-12 | End: 2022-03-14

## 2022-02-12 RX ORDER — ROPIVACAINE HYDROCHLORIDE 2 MG/ML
INJECTION, SOLUTION EPIDURAL; INFILTRATION; PERINEURAL PRN
Status: DISCONTINUED | OUTPATIENT
Start: 2022-02-12 | End: 2022-02-12 | Stop reason: SDUPTHER

## 2022-02-12 RX ORDER — CALCIUM CARBONATE 200(500)MG
1000 TABLET,CHEWABLE ORAL ONCE
Status: COMPLETED | OUTPATIENT
Start: 2022-02-12 | End: 2022-02-12

## 2022-02-12 RX ORDER — NALOXONE HYDROCHLORIDE 0.4 MG/ML
0.4 INJECTION, SOLUTION INTRAMUSCULAR; INTRAVENOUS; SUBCUTANEOUS PRN
Status: DISCONTINUED | OUTPATIENT
Start: 2022-02-12 | End: 2022-02-12 | Stop reason: HOSPADM

## 2022-02-12 RX ORDER — BISACODYL 10 MG
10 SUPPOSITORY, RECTAL RECTAL DAILY PRN
Status: DISCONTINUED | OUTPATIENT
Start: 2022-02-12 | End: 2022-02-14 | Stop reason: HOSPADM

## 2022-02-12 RX ORDER — IBUPROFEN 600 MG/1
600 TABLET ORAL EVERY 6 HOURS
Status: DISCONTINUED | OUTPATIENT
Start: 2022-02-12 | End: 2022-02-14 | Stop reason: HOSPADM

## 2022-02-12 RX ORDER — LANOLIN ALCOHOL/MO/W.PET/CERES
325 CREAM (GRAM) TOPICAL 2 TIMES DAILY
Qty: 90 TABLET | Refills: 1 | Status: SHIPPED | OUTPATIENT
Start: 2022-02-12 | End: 2022-03-22

## 2022-02-12 RX ORDER — ONDANSETRON 2 MG/ML
4 INJECTION INTRAMUSCULAR; INTRAVENOUS EVERY 6 HOURS PRN
Status: DISCONTINUED | OUTPATIENT
Start: 2022-02-12 | End: 2022-02-12 | Stop reason: HOSPADM

## 2022-02-12 RX ORDER — IBUPROFEN 600 MG/1
600 TABLET ORAL EVERY 6 HOURS PRN
Qty: 30 TABLET | Refills: 1 | Status: SHIPPED | OUTPATIENT
Start: 2022-02-12 | End: 2022-03-22

## 2022-02-12 RX ORDER — CARBOPROST TROMETHAMINE 250 UG/ML
INJECTION, SOLUTION INTRAMUSCULAR
Status: DISPENSED
Start: 2022-02-12 | End: 2022-02-13

## 2022-02-12 RX ORDER — LIDOCAINE HYDROCHLORIDE 10 MG/ML
INJECTION, SOLUTION EPIDURAL; INFILTRATION; INTRACAUDAL; PERINEURAL PRN
Status: DISCONTINUED | OUTPATIENT
Start: 2022-02-12 | End: 2022-02-12 | Stop reason: SDUPTHER

## 2022-02-12 RX ORDER — NALBUPHINE HCL 10 MG/ML
5 AMPUL (ML) INJECTION EVERY 4 HOURS PRN
Status: DISCONTINUED | OUTPATIENT
Start: 2022-02-12 | End: 2022-02-12 | Stop reason: HOSPADM

## 2022-02-12 RX ORDER — LIDOCAINE HYDROCHLORIDE AND EPINEPHRINE 15; 5 MG/ML; UG/ML
INJECTION, SOLUTION EPIDURAL PRN
Status: DISCONTINUED | OUTPATIENT
Start: 2022-02-12 | End: 2022-02-12 | Stop reason: SDUPTHER

## 2022-02-12 RX ORDER — ACETAMINOPHEN 500 MG
1000 TABLET ORAL EVERY 6 HOURS PRN
Status: DISCONTINUED | OUTPATIENT
Start: 2022-02-12 | End: 2022-02-14 | Stop reason: HOSPADM

## 2022-02-12 RX ORDER — LANOLIN 72 %
OINTMENT (GRAM) TOPICAL PRN
Status: DISCONTINUED | OUTPATIENT
Start: 2022-02-12 | End: 2022-02-14 | Stop reason: HOSPADM

## 2022-02-12 RX ORDER — SEVOFLURANE 250 ML/250ML
1 LIQUID RESPIRATORY (INHALATION) CONTINUOUS PRN
Status: DISCONTINUED | OUTPATIENT
Start: 2022-02-12 | End: 2022-02-12

## 2022-02-12 RX ORDER — ROPIVACAINE HYDROCHLORIDE 2 MG/ML
INJECTION, SOLUTION EPIDURAL; INFILTRATION; PERINEURAL
Status: COMPLETED
Start: 2022-02-12 | End: 2022-02-12

## 2022-02-12 RX ORDER — SIMETHICONE 80 MG
80 TABLET,CHEWABLE ORAL EVERY 6 HOURS PRN
Status: DISCONTINUED | OUTPATIENT
Start: 2022-02-12 | End: 2022-02-14 | Stop reason: HOSPADM

## 2022-02-12 RX ORDER — DOCUSATE SODIUM 100 MG/1
100 CAPSULE, LIQUID FILLED ORAL 2 TIMES DAILY
Status: DISCONTINUED | OUTPATIENT
Start: 2022-02-12 | End: 2022-02-14 | Stop reason: HOSPADM

## 2022-02-12 RX ADMIN — ROPIVACAINE HYDROCHLORIDE 4 ML: 2 INJECTION, SOLUTION EPIDURAL; INFILTRATION at 13:22

## 2022-02-12 RX ADMIN — LIDOCAINE HYDROCHLORIDE,EPINEPHRINE BITARTRATE 3 ML: 15; .005 INJECTION, SOLUTION EPIDURAL; INFILTRATION; INTRACAUDAL; PERINEURAL at 13:17

## 2022-02-12 RX ADMIN — SODIUM CHLORIDE, POTASSIUM CHLORIDE, SODIUM LACTATE AND CALCIUM CHLORIDE: 600; 310; 30; 20 INJECTION, SOLUTION INTRAVENOUS at 10:40

## 2022-02-12 RX ADMIN — LIDOCAINE HYDROCHLORIDE 3 ML: 10 INJECTION, SOLUTION EPIDURAL; INFILTRATION; INTRACAUDAL; PERINEURAL at 13:14

## 2022-02-12 RX ADMIN — Medication 10 ML/HR: at 13:24

## 2022-02-12 RX ADMIN — ACYCLOVIR 400 MG: 200 CAPSULE ORAL at 09:02

## 2022-02-12 RX ADMIN — SODIUM CHLORIDE, PRESERVATIVE FREE 10 ML: 5 INJECTION INTRAVENOUS at 08:30

## 2022-02-12 RX ADMIN — ANTACID TABLETS 1000 MG: 500 TABLET, CHEWABLE ORAL at 09:02

## 2022-02-12 RX ADMIN — ACYCLOVIR 400 MG: 200 CAPSULE ORAL at 13:36

## 2022-02-12 RX ADMIN — IBUPROFEN 600 MG: 600 TABLET, FILM COATED ORAL at 17:58

## 2022-02-12 RX ADMIN — LIDOCAINE HYDROCHLORIDE,EPINEPHRINE BITARTRATE 2 ML: 15; .005 INJECTION, SOLUTION EPIDURAL; INFILTRATION; INTRACAUDAL; PERINEURAL at 13:18

## 2022-02-12 RX ADMIN — Medication 1 MILLI-UNITS/MIN: at 09:00

## 2022-02-12 RX ADMIN — ONDANSETRON 4 MG: 2 INJECTION INTRAMUSCULAR; INTRAVENOUS at 10:08

## 2022-02-12 RX ADMIN — ROPIVACAINE HYDROCHLORIDE 4 ML: 2 INJECTION, SOLUTION EPIDURAL; INFILTRATION at 13:20

## 2022-02-12 ASSESSMENT — PAIN SCALES - GENERAL: PAINLEVEL_OUTOF10: 2

## 2022-02-12 NOTE — PROGRESS NOTES
Labor Progress Note    Shanika Landry is a 28 y.o. female  at 36w4d  The patient was seen and examined. Her pain is well controlled. She reports fetal movement is present, complains of contractions, denies loss of fluid, denies vaginal bleeding. Cervidil placed and patient tolerated well.     Vital Signs:  Vitals:    22 0706 22 1101 22 1522 22 1939   BP: (!) 141/74 130/69 132/76 (!) 124/100   Pulse: 82 79 90 92   Resp: 16 16 18 18   Temp: 97.4 °F (36.3 °C) 98.2 °F (36.8 °C) 98.2 °F (36.8 °C) 98.1 °F (36.7 °C)   TempSrc: Oral Oral Oral Oral         FHT: 135, moderate variability, accelerations present, decelerations absent  Contractions: regular, every 5 minutes    Chaperone for Intimate Exam: Chaperone was present for entire exam, Chaperone Name: Jimmy Loyola RN  Cervical Exam:/  Pitocin: @ 0 mu/min    Membranes: Intact  Scalp Electrode in place: absent  Intrauterine Pressure Catheter in Place: absent    Interventions: Cervidil placed    Assessment/Plan:  Shanika Landry is a 28 y.o. female  at 36w4d admitted for IUP, IOL 2/2 gHTN   - GBS negative, No indication for GBS prophylaxis   - VSS, afebrile    - cEFM/TOCO- Cat 1 tracing regular contractions   - IVF NS @ 125 ml/hr   - S/p cytotec 25 PO x 5, PV x1   - Cervidil placed   - Patient is declining a min balloon at this time   - Continue to monitor        Attending updated and in agreement with plan    Leeanne Washington DO  Ob/Gyn Resident  2022, 10:43 PM

## 2022-02-12 NOTE — PROGRESS NOTES
Labor Progress Note    Matthias Bill is a 28 y.o. female  at 36w4d  The patient was seen and examined. Her pain is well controlled. She reports fetal movement is present, complains of contractions, complains of loss of fluid, denies vaginal bleeding. Notified by RN of SROM clear fluid.      Vital Signs:  Vitals:    22 1939 22 2254 22 0349 22 0742   BP: (!) 124/100 135/75 (!) 109/53 131/77   Pulse: 92 69 84 88   Resp: 18 16 16 14   Temp: 98.1 °F (36.7 °C) 97.9 °F (36.6 °C)  97.3 °F (36.3 °C)   TempSrc: Oral Oral  Oral         FHT: 140, moderate variability, accelerations present, decelerations absent  Contractions: regular, every 5 minutes    Chaperone for Intimate Exam: Chaperone was present for entire exam, Chaperone Name: Livan MCCANN  Cervical Exam:   Pitocin: @ 0 mu/min    Membranes: Ruptured clear fluid  Scalp Electrode in place: absent  Intrauterine Pressure Catheter in Place: absent    Interventions: SVE performed, pitocin ordered    Assessment/Plan:  Matthias Bill is a 28 y.o. female  at 38w3d admitted for IOL 2/2 gHTN (new dx)   - GBS negative, No indication for GBS prophylaxis   - VSS, afebrile   - PreE labs wnl, P/C 0.14   - S/p Cytotec 25 mcg PO x5, PV x1   - S/p Cervidil x1   - SROM clear fluid @ 0720   - Pitocin per protocol ordered   - Continue to monitor closely        Attending updated and in agreement with plan    Shefali Bennett DO  Ob/Gyn Resident  2022, 8:18 AM

## 2022-02-12 NOTE — PROGRESS NOTES
Labor Progress Note    Kaye Zhang is a 28 y.o. female  at 36w4d  The patient was seen and examined. Her pain is well controlled and patient is feeling increased pressure in her bottom. She reports fetal movement is present, complains of contractions, complains of loss of fluid, denies vaginal bleeding.        Vital Signs:  Vitals:    22 1341 22 1345 22 1400 22 1430   BP: 131/61 126/62 118/78 123/64   Pulse: 68 63 70 77   Resp: 16 18 18 16   Temp:       TempSrc:       SpO2: 95% 95% 96% 98%       FHT: 130, moderate variability, accelerations present, decelerations absent  Contractions: regular, every 2-3 minutes    Chaperone for Intimate Exam: Chaperone was present for entire exam, Chaperone Name: Anny Jerry RN  Cervical Exam: 7 cm dilated, 90 effaced, +1 station  Pitocin: @ 8 mu/min    Membranes: Ruptured clear fluid  Scalp Electrode in place: absent  Intrauterine Pressure Catheter in Place: absent    Interventions: SVE    Assessment/Plan:  Kaye Zhang is a 28 y.o. female  at 38w3d admitted for IOL 2/2 gHTN ( new dx)     - GBS negative, No indication for GBS prophylaxis  - VSS, Afebrile  - cEFM/TOCO  - S/p Cervidil   - S/p cytotec 25 mcg PO x 5, PV x 1  - S/p nitrous   - Epidural in place  - Pitocin per protocol   - Anticipate vaginal delivery  - Continue to monitor          Attending updated and in agreement with plan    Kaci Martinez, DO  Ob/Gyn Resident  2022, 3:00 PM

## 2022-02-12 NOTE — ANESTHESIA PRE PROCEDURE
Department of Anesthesiology  Preprocedure Note       Name:  Kirstie Fregoso   Age:  28 y.o.  :  1990                                          MRN:  [id number]         Date:  2022      Surgeon: * No surgeons listed *    Procedure: * No procedures listed *    Medications prior to admission:   Prior to Admission medications    Medication Sig Start Date End Date Taking?  Authorizing Provider   aspirin 81 MG chewable tablet Take 81 mg by mouth daily   Yes Historical Provider, MD   Prenatal Vit-Fe Fumarate-FA (PRENATAL PO) Take by mouth   Yes Historical Provider, MD   omeprazole (PRILOSEC OTC) 20 MG tablet Take 1 tablet by mouth daily  Patient taking differently: Take 20 mg by mouth daily hasnt had to use, tums have helped 22   Fabian Augustin DO       Current medications:    Current Facility-Administered Medications   Medication Dose Route Frequency Provider Last Rate Last Admin    oxytocin (PITOCIN) 30 units in 500 mL infusion  1-20 jose roberto-units/min IntraVENous Continuous Kenia Park, DO 6 mL/hr at 22 1030 6 jose roberto-units/min at 22 1030    nitrous oxide 50% inhalation 1 each  1 each Inhalation Continuous PRN Xena Bunch DO        ropivacaine (NAROPIN) 0.2% injection 0.2%             naloxone (NARCAN) injection 0.4 mg  0.4 mg IntraVENous PRN Oral Paredes MD        nalbuphine (NUBAIN) injection 5 mg  5 mg IntraVENous Q4H PRN Oral Paredes MD        ondansetron (ZOFRAN) injection 4 mg  4 mg IntraVENous Q6H PRN Oral Paredes MD        ropivacaine 0.2% in sodium chloride 0.9% (OB) epidural 100 mL  10 mL/hr Epidural Continuous Christopher Carpenter MD        acyclovir (ZOVIRAX) capsule 400 mg  400 mg Oral TID Kushal Ford DO   400 mg at 22 0902    lactated ringers infusion   IntraVENous Continuous Pattie Mccarthy  mL/hr at 22 1250 999 mL/hr at 22 1250    lactated ringers bolus  500 mL IntraVENous PRN Katherine Acevedo DO        Or  lactated ringers bolus  1,000 mL IntraVENous PRN Nadine [place], DO        sodium chloride flush 0.9 % injection 5-40 mL  5-40 mL IntraVENous 2 times per day Nadine [place], DO   10 mL at 02/12/22 0830    sodium chloride flush 0.9 % injection 5-40 mL  5-40 mL IntraVENous PRN Nadine Ivan, DO        0.9 % sodium chloride infusion  25 mL IntraVENous PRN Nadine [place], DO        lidocaine PF 1 % injection 30 mL  30 mL Other PRN Nadine [place], DO        ondansetron (ZOFRAN) injection 4 mg  4 mg IntraVENous Q6H PRN Nadine [place], DO   4 mg at 02/12/22 1008    diphenhydrAMINE (BENADRYL) tablet 25 mg  25 mg Oral Q4H PRN Nadine Ivan, DO        acetaminophen (TYLENOL) tablet 1,000 mg  1,000 mg Oral Q6H PRN Nadine [place], DO        benzocaine-menthol (DERMOPLAST) 20-0.5 % spray   Topical PRN Nadine Ivan, DO           Allergies: Allergies   Allergen Reactions    Latex Rash     Not every time, causes irritation        Problem List:    Patient Active Problem List   Diagnosis Code    FH: spina bifida Z82.79    Elevated 1 hr, normal 3 hr GTT O99.810    Elevated blood pressure affecting pregnancy in third trimester, antepartum O16.3    HRP (high risk pregnancy), third trimester O09.93    Gestational hypertension (G1) O13.9       Past Medical History:        Diagnosis Date    Atypical squamous cells of undetermined significance (ASCUS) on Papanicolaou smear of cervix 06/11/2019    HPV+     Gestational hypertension 2/10/2022       Past Surgical History:        Procedure Laterality Date    TONSILLECTOMY      WISDOM TOOTH EXTRACTION  2019       Social History:    Social History     Tobacco Use    Smoking status: Former Smoker    Smokeless tobacco: Never Used   Substance Use Topics    Alcohol use:  No                                Counseling given: Not Answered      Vital Signs (Current):   Vitals:    02/12/22 1000 02/12/22 1100 02/12/22 1200 02/12/22 1203   BP: (!) 143/76 138/82  (!) 140/80   Pulse: 83 111  73   Resp: 18 16  16   Temp: 36.6 °C (97.9 °F)  36.4 °C (97.5 °F)    TempSrc: Oral  Oral                                               BP Readings from Last 3 Encounters:   02/12/22 (!) 140/80   02/10/22 126/74   01/27/22 122/68       NPO Status:                                                                                 BMI:   Wt Readings from Last 3 Encounters:   02/10/22 235 lb (106.6 kg)   01/27/22 229 lb (103.9 kg)   01/13/22 225 lb 9.6 oz (102.3 kg)     There is no height or weight on file to calculate BMI.    CBC:   Lab Results   Component Value Date    WBC 13.7 02/10/2022    RBC 3.90 02/10/2022    HGB 9.7 02/10/2022    HCT 30.2 02/10/2022    MCV 77.4 02/10/2022    RDW 14.0 02/10/2022     02/10/2022       CMP:   Lab Results   Component Value Date     02/10/2022    K 3.9 02/10/2022    CL 99 02/10/2022    CO2 20 02/10/2022    BUN 5 02/10/2022    CREATININE 0.44 02/10/2022    GFRAA >60 02/10/2022    LABGLOM >60 02/10/2022    GLUCOSE 110 02/10/2022    PROT 6.8 02/10/2022    CALCIUM 9.0 02/10/2022    BILITOT 0.18 02/10/2022    ALKPHOS 141 02/10/2022    AST 16 02/10/2022    ALT 19 02/10/2022       POC Tests: No results for input(s): POCGLU, POCNA, POCK, POCCL, POCBUN, POCHEMO, POCHCT in the last 72 hours.     Coags: No results found for: PROTIME, INR, APTT    HCG (If Applicable):   Lab Results   Component Value Date    PREGTESTUR POSITIVE 07/22/2021        ABGs: No results found for: PHART, PO2ART, [id number], ASO5IFB, BEART, [id number]     Type & Screen (If Applicable):  No results found for: LABABO, LABRH    Drug/Infectious Status (If Applicable):  Lab Results   Component Value Date    HEPCAB NONREACTIVE 07/22/2021       COVID-19 Screening (If Applicable):   Lab Results   Component Value Date    COVID19 Not Detected 02/10/2022           Anesthesia Evaluation  Patient summary reviewed and Nursing notes reviewed no history of anesthetic complications:   Airway: Mallampati: II  TM distance: >3 FB   Neck ROM: full  Mouth opening: > = 3 FB Dental: normal exam         Pulmonary:Negative Pulmonary ROS and normal exam                               Cardiovascular:Negative CV ROS  Exercise tolerance: good (>4 METS),   (+) hypertension:,           Rate: normal           Beta Blocker:  Not on Beta Blocker         Neuro/Psych:   Negative Neuro/Psych ROS              GI/Hepatic/Renal: Neg GI/Hepatic/Renal ROS            Endo/Other: Negative Endo/Other ROS                    Abdominal:   (+) obese,     Abdomen: soft. Vascular: Other Findings:             Anesthesia Plan      epidural     ASA 2             Anesthetic plan and risks discussed with patient. Use of blood products discussed with patient whom. Plan discussed with attending.     Attending anesthesiologist reviewed and agrees with Preprocedure content              JORJE Mercedes - CRNA   2/12/2022

## 2022-02-12 NOTE — ANESTHESIA POSTPROCEDURE EVALUATION
Department of Anesthesiology  Postprocedure Note    Patient: Armando Perez  MRN: [id number]  YOB: 1990  Date of evaluation: 2/12/2022  Time:  5:02 PM     Procedure Summary     Date: 02/12/22 Room / Location:     Anesthesia Start: 1306 Anesthesia Stop: 8035    Procedure: Labor Analgesia Diagnosis:     Scheduled Providers:  Responsible Provider: July Beltran MD    Anesthesia Type: epidural ASA Status: 2          Anesthesia Type: epidural    Guevara Phase I:      Guevara Phase II:      Last vitals: Reviewed and per EMR flowsheets.        Anesthesia Post Evaluation    Patient location during evaluation: bedside  Patient participation: complete - patient participated  Level of consciousness: awake and alert  Pain score: 0  Airway patency: patent  Nausea & Vomiting: no nausea and no vomiting  Complications: no  Cardiovascular status: hemodynamically stable  Respiratory status: acceptable and room air  Hydration status: euvolemic

## 2022-02-12 NOTE — FLOWSHEET NOTE
[id number] [facility], CRNA at bedside. Epidural procedure explained, risks discussed. Pt verbalizes consent for epidural.   1300 patient positioned for epidural.1309 Time out completed. 1316 catheter placed. 1316 test dose given, HR 87.  Epidural catheter taped and secured per anesthesia. 1320 to low fowlers with left uterine displacement. 1321 loading dose given. pump initiated. Pt tolerated procedure well.

## 2022-02-12 NOTE — PROGRESS NOTES
Labor Progress Note    April Shields is a 28 y.o. female  at 36w4d  The patient was seen and examined. Her pain is well controlled. Patient is resting comfortably in bed. Occasional contractions noted. Patient counseled extensively on min balloon yesterday. Continues to decline. Will reassess and develop plan after removal of cervidil. Vital Signs:  Vitals:    22 1522 22 1939 22 2254 22 0349   BP: 132/76 (!) 124/100 135/75 (!) 109/53   Pulse: 90 92 69 84   Resp: 18 18 16 16   Temp: 98.2 °F (36.8 °C) 98.1 °F (36.7 °C) 97.9 °F (36.6 °C)    TempSrc: Oral Oral Oral          FHT: 135, moderate variability, accelerations present, decelerations absent  Contractions: intermittently noted. No consistent pattern. Cervical Exam: deferred    Membranes: Intact  Scalp Electrode in place: absent  Intrauterine Pressure Catheter in Place: absent    Assessment/Plan:  April Shields is a 28 y.o. female  at 38w3d admitted for IOL 2/2 gHTN   - GBS negative, No indication for GBS prophylaxis   - SVE: 4, 90, 0    - Cervidil in place, out @ 1045   - S/p Cytotec 25 PO x5, 25 PV x 1   - Continue current management    Attending updated and in agreement with plan.     Yecenia Shah, DO  Ob/Gyn Resident  2022, 4:56 AM

## 2022-02-12 NOTE — PROGRESS NOTES
Labor Progress Note    Iraida Torres is a 28 y.o. female  at 36w4d  The patient was seen and examined. Her pain is well controlled. She reports fetal movement is present, complains of contractions, denies loss of fluid, denies vaginal bleeding.        Vital Signs:  Vitals:    22 0706 22 1101 22 1522 22 1939   BP: (!) 141/74 130/69 132/76 (!) 124/100   Pulse: 82 79 90 92   Resp: 16 16 18 18   Temp: 97.4 °F (36.3 °C) 98.2 °F (36.8 °C) 98.2 °F (36.8 °C) 98.1 °F (36.7 °C)   TempSrc: Oral Oral Oral Oral         FHT: 135, moderate variability, accelerations present, decelerations absent  Contractions: regular, every 5 minutes    Chaperone for Intimate Exam: Chaperone was present for entire exam, Chaperone Name: Karole Skiff RN  Cervical Exam:/  Pitocin: @ 0 mu/min    Membranes: Intact  Scalp Electrode in place: absent  Intrauterine Pressure Catheter in Place: absent    Interventions: none    Assessment/Plan:  Iraida Torres is a 28 y.o. female  at 36w4d admitted for IUP, IOL 2/2 gHTN   - GBS negative, No indication for GBS prophylaxis   - VSS, afebrile    - cEFM/TOCO- Cat 1 tracing regular contractions   - IVF NS @ 125 ml/hr   - S/p cytotec 25 PO x 5, PV x1   - Cervidil ordered   - Patient is declining a min balloon at this time   - Continue to monitor        Attending updated and in agreement with plan    Carlos Calix DO  Ob/Gyn Resident  2022, 10:13 PM

## 2022-02-12 NOTE — FLOWSHEET NOTE
1150 Pt educated on the use of self-administered  nitrous oxide for pain control and side effects. Pt educated on when and how to use the mask appropriately. Pt educated that she is the only person allowed to hold the mask to her face and that no one should prop the mask against her face. Pt also educated that she is the only person in the room allowed to use the mask. Pt informed that she cannot be out of bed without a trained support person with her. Pt completed a return demonstration of the use of nitrous oxide and all questions and concerns were addressed. RN verified the presence of a signed agreement form for the nitrous oxide. Pre-intervention VS, assessment, and FHT'st as charted. Nitrous oxide and oxygen at a 50-50 mix was initiated at 1151. RN remains at bedside for the first 15 mins post initiation. Pt has experienced  No side effects at this time.

## 2022-02-12 NOTE — L&D DELIVERY NOTE
Mother's Information    Labor Events     labor?: No  Rupture type: Spontaneous=SROM  Fluid color: Clear  Fluid odor: None     Mother Delivery Information    Episiotomy: None  Lacerations: None  Surgical or Additional Est. Blood Loss (mL): 0 (View Only): Edit in Flowsheets   Combined Est. Blood Loss (mL): 0        Deleta Chante Zaman [[id number]]    Labor Events     labor?: No   steroids?: None  Cervical ripening date/time:     Cervical ripening type: Misoprostol  Rupture date/time: 22 07:20:00   Rupture type: Spontaneous=SROM  Fluid color: Clear  Fluid odor: None  Induction: Misoprostol, Cervidil, Oxytocin  Indications for induction: Hypertension  Augmentation: None          Labor Event Times    Labor onset date/time:     Dilation complete date/time:  22   Start pushin2022   Decision time (emergent ):        Anesthesia    Method: Epidural     Assisted Delivery Details    Forceps attempted?: No  Vacuum extractor attempted?: No     Document Additional Attempt       Document Additional Attempt             Shoulder Dystocia    Shoulder dystocia present?: No  Add Second Maneuver  Add Third Maneuver  Add Fourth Maneuver  Add Fifth Maneuver  Add Sixth Maneuver  Add Seventh Maneuver  Add Eighth Maneuver  Add Ninth Maneuver      Presentation    Presentation: Vertex  _: Occiput     [place] Information    Head delivery date/time: 2022 16:42:00   Changing the 's delivery date/time could affect patient care.:    Delivery date/time:  22   Delivery type: Vaginal, Spontaneous    Details:        Delivery Providers    Delivering clinician: Lisa Chin,    Provider Role    Klaudia Cardozo, DO Norbert Rodrigues, RAMY Mcclelland RN       Cord    Vessels: 3 Vessels  Complications: None  Cord clamped date/time: 2022  Cord blood disposition: Lab  Gases sent?: Yes  Stem cell collection (by provider): No     Placenta    Date/time: 2022 16:57:08  Removal: Spontaneous  Appearance: Intact  Disposition: Lab     Delivery Resuscitation    Method: Bulb Suction, Stimulation     Apgars    Living status: Living  Apgars   1 Minute:  5 Minute:  10 Minute 15 Minute 20 Minute   Skin Color: 0  1       Heart Rate: 2  2       Reflex Irritability: 2  2       Muscle Tone: 2  2       Respiratory Effort: 2  2       Total: 8  9               Apgars Assigned By: BERENICE Kingston     Skin to Skin    Skin to skin initiation date/time: 22 16:43:00 EST   Skin to skin with: Mother  Skin to skin end date/time:        [place] Measurements       Delivery Information    Episiotomy: None  Perineal lacerations: None    Vaginal laceration: Yes Repaired: Yes   Cervical laceration: No    Surgical or additional est. blood loss (mL): 0 (View Only): Edit in Flowsheets   Combined est. blood loss (mL): 0     Vaginal Delivery Counts    Initial count personnel: LUIS  Initial count verified by: DR. Luke Oconnell   4x4:  Needles:  Instruments:  Lap Pads:  Sponges:    Initial counts:         Final counts:         Final count personnel: Varsha Lanier  Final count verified by: DR. Luke Oconnell  Accurate final count?: Yes  Final vaginal sweep completed: Yes     Other Procedures    Procedures: None            Vaginal Delivery Note  Department of Obstetrics and Gynecology  [address] [place] [address]       Patient: Sandeep Alvarado   : 1990  MRN: [id number]   Date of delivery: 22     Pre-operative Diagnosis: Sandeep Holes  at 38w3d  IOL 2/2 gHTN (new dx)  Anemia  FHx spina bifida  Elevated 1 hr GTT, 3 hr GTT wnl  BMI 36    Post-operative Diagnosis:  Living  female infant, same as above    Delivering Obstetrician & Assistant(s): Dr. Ricardo Hadley;  Adrian Gomez DO PGY4; Mounika Wesley DO PGY1    Infant Information:   Information for the patient's :  Domenic Em Zaman [[id number]]        Information for the patient's :  Veronica Zaman [[id number]]          Apgar scores: 8 at 1 minute and 9 at 5 minutes. Anesthesia:  epidural anesthesia    Application and Delivery:    She was known to be GBS negative. The patient progressed well, received an epidural, became complete and felt the urge to push. After pushing with contractions the fetal head delivered Cephalic, left occiput anterior over an intact perineum, nuchal cord was not present. The anterior, then posterior shoulder delivered easily and atraumatically followed by the rest of the infant. Nose and mouth suctioned with bulb suction, infant was stimulated and dried. Cord was clamped and cut after one minute delayed cord clamping  and infant was placed on maternal abdomen, and attended by RN for evaluation. The delivery of the placenta was spontaneous and appeared intact, whole and that the umbilical cord had three vessels noted. Pitocin was started. Uterine atony was noted and resolved with bimanual massage. The vagina was swept of all clots and debris. The perineum and vagina were evaluated and a right vaginal laceration was found and repaired in standard fashion with 3-0 vicryl. Mother and baby tolerated procedure well. Dr. Sharron Pérez was present for entire delivery.     Delivery Summary:       Specimen: placenta sent to pathology, cord blood and cord gases  Quantitative blood loss:  250ml immediately following delivery  Condition:  infant stable to general nursery and mother stable  Counts: instrument and sponge counts correct  Blood Type and Rh: A POSITIVE    Rubella Immunity Status: immune  Infant Feeding: breast feeding    Coco Brito DO  Ob/Gyn Resident  2022, 5:15 PM

## 2022-02-12 NOTE — PROGRESS NOTES
Labor Progress Note    Kirstie Fregoso is a 28 y.o. female  at 36w4d  The patient was seen and examined. Her pain is not well controlled with Nitrous oxide. She is interested in epidural at this time. She reports fetal movement is present, complains of contractions, complains of loss of fluid, denies vaginal bleeding.     Vital Signs:  Vitals:    22 1000 22 1100 22 1200 22 1203   BP: (!) 143/76 138/82  (!) 140/80   Pulse: 83 111  73   Resp: 18 16  16   Temp: 97.9 °F (36.6 °C)  97.5 °F (36.4 °C)    TempSrc: Oral  Oral          FHT: 140, moderate variability, accelerations present, decelerations absent  Contractions: regular, every 2-3 minutes    Chaperone for Intimate Exam: Chaperone was present for entire exam, Chaperone Name: Eufemia Marie RN  Cervical Exam: 3-/0  Pitocin: @ 6 mu/min    Membranes: Ruptured clear fluid  Scalp Electrode in place: absent  Intrauterine Pressure Catheter in Place: absent    Interventions: SVE performed, epidural ordered    Assessment/Plan:  Kirstie Fregoso is a 28 y.o. female  at 38w3d admitted for IOL 2/2 gHTN (new dx)   - GBS negative, No indication for GBS prophylaxis   - VSS, afebrile   - PreE labs wnl, P/C 0.14   - S/p Cytotec 25 mcg PO x5, PV x1   - S/p Cervidil x1   - SROM clear fluid @ 0720   - Continue Pitocin per protocol    - Epidural ordered   - Continue to monitor closely        Senior resident updated and in agreement with plan    Xena Bunch DO  Ob/Gyn Resident  2022, 12:47 PM

## 2022-02-13 PROCEDURE — 6370000000 HC RX 637 (ALT 250 FOR IP): Performed by: STUDENT IN AN ORGANIZED HEALTH CARE EDUCATION/TRAINING PROGRAM

## 2022-02-13 PROCEDURE — 1220000000 HC SEMI PRIVATE OB R&B

## 2022-02-13 RX ADMIN — IBUPROFEN 600 MG: 600 TABLET, FILM COATED ORAL at 00:09

## 2022-02-13 RX ADMIN — IBUPROFEN 600 MG: 600 TABLET, FILM COATED ORAL at 12:06

## 2022-02-13 RX ADMIN — IBUPROFEN 600 MG: 600 TABLET, FILM COATED ORAL at 20:38

## 2022-02-13 RX ADMIN — DOCUSATE SODIUM 100 MG: 100 CAPSULE ORAL at 08:41

## 2022-02-13 RX ADMIN — ACETAMINOPHEN 1000 MG: 500 TABLET ORAL at 20:37

## 2022-02-13 RX ADMIN — DOCUSATE SODIUM 100 MG: 100 CAPSULE ORAL at 00:09

## 2022-02-13 RX ADMIN — IBUPROFEN 600 MG: 600 TABLET, FILM COATED ORAL at 06:04

## 2022-02-13 RX ADMIN — DOCUSATE SODIUM 100 MG: 100 CAPSULE ORAL at 20:38

## 2022-02-13 ASSESSMENT — PAIN SCALES - GENERAL
PAINLEVEL_OUTOF10: 1
PAINLEVEL_OUTOF10: 2
PAINLEVEL_OUTOF10: 2
PAINLEVEL_OUTOF10: 1
PAINLEVEL_OUTOF10: 1

## 2022-02-13 NOTE — CARE COORDINATION
CASE MANAGEMENT POST-PARTUM TRANSITIONAL CARE PLAN    Elevated blood pressure affecting pregnancy in third trimester, antepartum [O16.3]  HRP (high risk pregnancy), third trimester [O09.93]    OB Provider: Dr. Moent Chung met w/ Kee Edwards  at bedside to discuss DCP. She is S/P   on 22    Writer verified name/address/phone number correct on facesheet. She states she lives with her / Angelo Ayers . She  verbalized no problems with transportation to and from doctors appointments or with paying for medications upon discharge home. [employer] insurance correct. Writer notified Kee Edwards she has  30 days from date of birth to add  to insurance policy. She verbalized understanding.     Infant name on BC: Mathew Cobb       Infant to [facility]      Infant PCP Undecided ( has list)         Mom states she has contacted 2 Pediatric groups in [place] prior to birth ( 1 not accepting newborns, other Out of Network)    FOB: Shayan Torrez  verbalized she has a car seat and safe place for infant to sleep in at home    DME: none    HOME CARE: none    Readmission Risk              Risk of Unplanned Readmission:  5            Barriers to DC: none     Anticipate DC of couplet 22              Case management will continue to follow for discharge needs

## 2022-02-13 NOTE — PROGRESS NOTES
CLINICAL PHARMACY NOTE: MEDS TO BEDS    Total # of Prescriptions Filled: 3   The following medications were delivered to the patient:  · Ibuprofen 600 mg  · Docusate 100 mg  · Fersul 325 mg    Additional Documentation:

## 2022-02-13 NOTE — LACTATION NOTE
Breastfeeding education given and reviewed with pt. Encouraged pt to call out for assistance with feeds.  swaddled at this time, assisted with attempting a feed. Reviewed deep latch and slight adjustment in positioning. Able to deeply latch to left breast in cross cradle position, more comfortable per pt. Pt does very well latching baby on own. Encouraged lining up nose to nipple instead of mouth to nipple. Reviewed hunger cues and normal  feeding patterns. Pt states she has a new Spectra S1 pump at home, reviewed when to use it and how to operate the pump.

## 2022-02-13 NOTE — PROGRESS NOTES
POST PARTUM DAY # 1    Junie Garces is a 28 y.o. female  This patient was seen & examined today. Her pregnancy was complicated by:   Patient Active Problem List   Diagnosis    FH: spina bifida    Elevated 1 hr, normal 3 hr GTT    Elevated blood pressure affecting pregnancy in third trimester, antepartum    HRP (high risk pregnancy), third trimester    Gestational hypertension (G1)     22 F Apg ** Wt #       Today she is doing well without any chief complaint. Her lochia is light. She denies chest pain, shortness of breath, headache, lightheadedness and blurred vision. She is  breast feeding and she denies any breast tenderness. She is ambulating well. Her voiding pattern is normal. I reviewed signs and symptoms of post partum depression with the patient, she currently denies any of these symptoms. She is tolerating solids. Vital Signs:  Vitals:    22 1815 22 1830 22 1845 22   BP: 119/66 126/70 127/74 133/74   Pulse: 65 63 74 81   Resp: 18 16 18 16   Temp:   98.8 °F (37.1 °C) 97.7 °F (36.5 °C)   TempSrc:   Oral Oral   SpO2:    99%         Physical Exam:  General:  no apparent distress, alert and cooperative  Neurologic:  alert, oriented, normal speech, no focal findings or movement disorder noted  Lungs:  No increased work of breathing, good air exchange  Heart:  Normal apical impulse, regular rate and rhythm  Abdomen: abdomen soft, non-distended, non-tender  Fundus: non-tender, firm, below umbilicus  Extremities:  no calf tenderness, non edematous    Lab:  Lab Results   Component Value Date    HGB 9.7 (L) 02/10/2022     Lab Results   Component Value Date    HCT 30.2 (L) 02/10/2022       Assessment/Plan:  1. Bella Serna is a  PPD # 1 s/p    - Doing well, VSS   - female infant in [address]   - Encourage ambulation   - Postpartum Hgb/Hct if sx   - Motrin/Tylenol for pain control    2.  Rh positive/Rubella immune   - Rhogam and MMR not indicated    3. Breast feeding   - Denies s/s mastitis    4. gHTN (new dx)   - VSS   - PreE labs wnl, P/C 0.14   - Denies s/s PreE     5. Anemia   - Hgb 9.7 on admission   - VSS, clinically asymptomatic    - Iron supplementation upon discharge    6. BMI 36    7. Continue post partum care    Counseling Completed:  Secondary Smoke risks and Sudden Infant Death Syndrome were reviewed with recommendations. Infant sleeping, \"back to sleep\" and avoidance of co-sleeping recommendations were reviewed. Signs and Symptoms of Post Partum Depression were reviewed. The patient is to call if any occur. Signs and symptoms of Mastitis were reviewed. The patient is to call if any occur for follow up. Discharge instructions including pelvic rest, no driving with pain medicine and office follow-up were reviewed with patient     Attending Physician: Dr. Liz Chow DO  Ob/Gyn Resident   2/12/2022, 11:14 PM           Attending Physician Statement  I have discussed the care of Rod Nurse, including pertinent history and exam findings,  with the resident. I have seen and examined the patient and the key elements of all parts of the encounter have been performed by me. I agree with the assessment, plan and orders as documented by the resident.   [facility] Modifier)    Justyna Garay DO

## 2022-02-13 NOTE — LACTATION NOTE
Assist pt with breastfeeding. [place] is fussy at the breast, would not calm with sucking finger or hand expression of colostrum. [place] calmed in skin to skin. Encouraged to try again in 30 minutes.  Oral syringes and spoons given to pt for hand expression to use for feeding if needed between attempts at breast.

## 2022-02-14 VITALS
SYSTOLIC BLOOD PRESSURE: 121 MMHG | OXYGEN SATURATION: 98 % | HEART RATE: 71 BPM | DIASTOLIC BLOOD PRESSURE: 78 MMHG | TEMPERATURE: 97.9 F | RESPIRATION RATE: 16 BRPM

## 2022-02-14 PROCEDURE — 99024 POSTOP FOLLOW-UP VISIT: CPT | Performed by: OBSTETRICS & GYNECOLOGY

## 2022-02-14 PROCEDURE — 6370000000 HC RX 637 (ALT 250 FOR IP): Performed by: STUDENT IN AN ORGANIZED HEALTH CARE EDUCATION/TRAINING PROGRAM

## 2022-02-14 RX ADMIN — DOCUSATE SODIUM 100 MG: 100 CAPSULE ORAL at 10:20

## 2022-02-14 RX ADMIN — ACETAMINOPHEN 1000 MG: 500 TABLET ORAL at 05:27

## 2022-02-14 RX ADMIN — IBUPROFEN 600 MG: 600 TABLET, FILM COATED ORAL at 05:27

## 2022-02-14 ASSESSMENT — PAIN SCALES - GENERAL: PAINLEVEL_OUTOF10: 2

## 2022-02-14 NOTE — PROGRESS NOTES
POST PARTUM DAY # 1    Junie Madison is a 33 y.o. female  This patient was seen & examined today.  on 22    Her pregnancy was complicated by:   Patient Active Problem List   Diagnosis    FH: spina bifida    Elevated 1 hr, normal 3 hr GTT    Elevated blood pressure affecting pregnancy in third trimester, antepartum    HRP (high risk pregnancy), third trimester    Gestational hypertension (G1)     22 F Apg 8/9 Wt 7#12       Today she is doing well without any chief complaint. Her lochia is light. She denies chest pain, shortness of breath, headache, lightheadedness and blurred vision. She is breast feeding and she denies any breast tenderness. She is ambulating well. Her voiding pattern is normal. I reviewed signs and symptoms of post partum depression with the patient, she currently denies any of these symptoms. She is tolerating solids. Vital Signs:  Vitals:    22 0835 22 1136 22 1600 22   BP: 131/81 119/82 120/73 132/83   Pulse: 82 82 81 79   Resp: 17 17 18 18   Temp: 98.2 °F (36.8 °C) 97.7 °F (36.5 °C) 97.5 °F (36.4 °C) 97.9 °F (36.6 °C)   TempSrc: Oral Oral Oral Oral   SpO2: 96% 97% 98% 98%         Physical Exam:  General:  no apparent distress, alert and cooperative  Neurologic:  alert, oriented, normal speech, no focal findings or movement disorder noted  Lungs:  No increased work of breathing, good air exchange, clear to auscultation bilaterally, no crackles or wheezing  Heart:  Normal apical impulse, regular rate and rhythm, normal S1 and S2, no S3 or S4, and no murmur noted    Abdomen: abdomen soft, non-distended, non-tender  Fundus: non-tender, firm, below umbilicus  Extremities:  no calf tenderness, non edematous    Lab:  Lab Results   Component Value Date    HGB 9.7 (L) 02/10/2022     Lab Results   Component Value Date    HCT 30.2 (L) 02/10/2022       Assessment/Plan:  1.  Sandeep Jenny is a  PPD # 2 s/p    - Doing well, VSS   - female infant in [address]   - Encourage ambulation  2. Rh positive/Rubella immune  3. Breast feeding   - Denies s/s mastitis  4. gHTN (newly dx)  - Blood pressures stable overnight  - Denies s/s preeclampsia  - PreE labs wnl, P/C 0.14 (2/10)   5. Anemia  - Hgb 9.7 on admission  - Clinically asymptomatic  - Iron supplementation on discharge  6. Continue post partum care    Counseling Completed:  Secondary Smoke risks and Sudden Infant Death Syndrome were reviewed with recommendations. Infant sleeping, \"back to sleep\" and avoidance of co-sleeping recommendations were reviewed. Signs and Symptoms of Post Partum Depression were reviewed. The patient is to call if any occur. Signs and symptoms of Mastitis were reviewed. The patient is to call if any occur for follow up.   Discharge instructions including pelvic rest, no driving with pain medicine and office follow-up were reviewed with patient     Attending Physician: Dr. Danielle Wolfe DO  Ob/Gyn Resident   2/14/2022, 12:23 AM     No att. providers found

## 2022-02-14 NOTE — PLAN OF CARE
Problem: Anxiety:  Goal: Level of anxiety will decrease  Description: Level of anxiety will decrease  Outcome: Completed     Problem: Infection - Intrapartum Infection:  Goal: Will show no infection signs and symptoms  Description: Will show no infection signs and symptoms  Outcome: Completed     Problem: Pain - Acute:  Goal: Pain level will decrease  Description: Pain level will decrease  Outcome: Completed  Goal: Able to cope with pain  Description: Able to cope with pain  Outcome: Completed     Problem: Pain:  Goal: Pain level will decrease  Description: Pain level will decrease  Outcome: Completed  Goal: Control of acute pain  Description: Control of acute pain  Outcome: Completed  Goal: Control of chronic pain  Description: Control of chronic pain  Outcome: Completed     Problem: Discharge Planning:  Goal: Discharged to appropriate level of care  Description: Discharged to appropriate level of care  Outcome: Completed     Problem: Constipation:  Goal: Bowel elimination is within specified parameters  Description: Bowel elimination is within specified parameters  Outcome: Completed     Problem: Fluid Volume - Imbalance:  Goal: Absence of imbalanced fluid volume signs and symptoms  Description: Absence of imbalanced fluid volume signs and symptoms  Outcome: Completed  Goal: Absence of postpartum hemorrhage signs and symptoms  Description: Absence of postpartum hemorrhage signs and symptoms  Outcome: Completed     Problem: Infection - Risk of, Puerperal Infection:  Goal: Will show no infection signs and symptoms  Description: Will show no infection signs and symptoms  Outcome: Completed     Problem: Mood - Altered:  Goal: Mood stable  Description: Mood stable  Outcome: Completed

## 2022-02-14 NOTE — CARE COORDINATION
Social Work     Sw reviewed medical record (current active problem list) and tox screens and found no concerns. Sw spoke with mom briefly to explain Sw role, inquire if any needs or concerns, and provide safe sleep education and discuss. Mom denied any needs or questions and informs baby has a safe sleep environment (bassinet). Mom denied any current s/s of anxiety or depression and is aware to reach out to OB if any s/s occur after dc. Mom reports a good support system and denied any current questions or needs. Mom reports this is her first child and reports she is reviewing list to choose ped. Sw encouraged mom to reach out if any issues or concerns arise.

## 2022-02-14 NOTE — CARE COORDINATION
Discharge 1 [facility] Case Management Department  Written by: Florencio Mendez RN    Patient Name: Sindy Cramer  Attending Provider: Erika Hess MD  Admit Date: 2/10/2022 10:51 AM  MRN: [id number]  Account: [de-identified]                     : 1990  Discharge Date: 2022      Disposition: home with infant    Florencio Mendez RN

## 2022-02-14 NOTE — LACTATION NOTE
Mom reports baby feeding well with better latch. Requesting follow up information for peds dentistry to have tongue restriction evaluated. Discussed cranial sacral therapy, pamphlet given. Reviewed discharge instructions and LC follow up.

## 2022-02-14 NOTE — FLOWSHEET NOTE
Congratulations on the birth of your baby! We hope we have provided very good care always during your stay in the [facility]. We want to ensure that you have the help you need when you leave the hospital. If there is anything we can assist you with, please let us know. Patient Name Rianna Newberry    Date 2022    Weight at Discharge         Car Seat Test Results        Car Seat Safety  For the best protection, keep your baby in a rear-facing car seat for as long as possible  usually until about 3years old. You can find the exact height and weight limit on the side or back of your car seat. Kids who ride in rear-facing seats have the best protection for the head, neck and spine. It is especially important for rear-facing children to ride in a back seat and always away from the front airbag. Look at the label on your car seat to make sure its appropriate for your childs age, weight and height. Your car seat has an expiration date  usually around six years. Find and double check the label to make sure its still safe. Discard a seat that is  in a dark trash bag so that it cannot be pulled from the trash and reused. Buy a used car seat only if you know its full crash history. That means you must buy it from someone you know, not from a thrift store or over the internet. Once a car seat has been in a crash, it needs to be replaced. Never leave your infant unattended in a car safety seat, either inside or out of a car. Avoid leaving your infant in car safety seats for long periods to lessen the chance of breathing trouble. It's best to use the car safety seat only for travel in your car.  Always send in your car seats registration card to be notified is your car seat is ever recalled. Make Sure Your Car Seat is Installed Correctly  [facility] [employer]. Once your car seat is installed, give it a good tug at the base where the seat belt goes through it.  Can you move it more than an inch side to side or front to back? A properly installed seat will not move more than an inch. Use either the cars seat belt or the lower anchors. Dont use both the lower anchors and seat belt at the same time. They are equally safe- so pick the car seat that gives you the best fit. If you are having even the slightest trouble, questions or concerns, certified child passenger safety technicians are able to help or even double check your work. Visit a certified technician to make sure your car seat is properly installed. Find a technician or car seat checkup event near you. Recline a rear-facing car safety seat at about 45 degrees or as directed by the instructions that came with the seat. Whenever possible, have an adult seated in the rear seat near the infant in the car safety seat. If a second caregiver is not available, know that you may need to safely stop your car to assist your infant, especially if a monitor alarm has sounded. How to Place Your Baby in the [address] your infant so that his buttocks and back are flat against the seat back.  The harness straps should go into a slot that is at or below the shoulders for a rear facing car seat. The straps should be flat and not twisted. Pinch Test. Make sure the harness is tightly buckled. With the chest clip placed at armpit level, pinch the strap at your childs shoulder. If you are unable to pinch any excess webbing, youre good to go. Use only the head-support system that comes with your car safety seat. Avoid any head supports that are sold separately. If your baby is small, you may place rolled up blankets on the sides of them. Dress your baby in clothes that allow the car seat straps to go between the legs. In cold weather place a blanket on top of your baby after adjusting the harness straps. Do not  swaddle or dress the baby in a thick coat under the straps      .       Prevent Heatstroke  Never leave your child alone in a car, not even for a minute. While it may be tempting to dash out for a quick errand while your babies are sleeping peacefully in their car seats, the temperature inside your car can rise 20 degrees and cause heatstroke in the time it takes for you to run in and out of the store. Place a soft toy in the front seat  as a reminder that your child is in the back seat. Leaving a child alone in a car is against the law in many states. SAFE SLEEP  As part of the national Safe to Sleep initiative, we encourage you to use sleep sacks for your baby at home and keep your baby Alone, on its Back in a Crib. Since the launch of the Safe to Sleep campaign in 1994, the SIDS rate has dropped by more than 50%!   ~ Always place your baby on a firm mattress with a tightly fitting sheet in a safety-approved crib.     ~ Never use soft bedding, comforters, pillows, loose sheets, blankets, toys, stuffed animals or bumpers in the crib or sleep area. These things may put your baby at risk for suffocation!     ~ Bed sharing with your baby increases the chance of dying of SIDS by 40 times!     ~ Think about offering a pacifier to your baby. Research shows that pacifier use during sleep is associated with a reduced risk of SIDS. Do not force your baby to take a pacifier while asleep. Once it falls out, leave it out. You can wait one month before offering a pacifier if your baby is breastfeeding, so breastfeeding can be well established.  ~ Do not overheat your baby. If you are comfortable in the room, then your baby will be also. ~ Provide supervised \"Tummy Time\" for your baby while he/she is awake. This reduces the chance that your baby will get flat spots and bald spots on their head, helps strengthen the baby's head and neck muscles, and also get the baby ready for crawling. FOLLOW UP CARE    If enrolled in the [facility] program, your infant's crib card may be required for your first visit.   Please refer to the handouts provided to you in your [address] [facility] folder. I have received an [address] brochure entitled \"Parent Information about [place] Screening\". I have received the [employer] your [place]" information packet. I have read and understand this information and do not have further questions. I will review this information with all the caregivers for my child(linsey). INFANT FEEDING FOR MOST NEWBORNS  Diet:     Newborns will eat about every 2-5 hours. Allow not longer that 5 hours between feedings at night. Be alert to early hunger cues. Infants should total about 8 feeding in each 24 hour period.  For breastfeeding, get into a comfortable position. Infant should nurse every 2-3 hours or more frequently.  Breast fed babies should have at least 8 feedings in a 24 hour period.  To prepare formula follow the 's instructions. Keep bottles and nipples clean. DO NOT reuse formula from a bottle used for a previous feeding. Formula is typically only good for ONE hour after the baby begins to eat from the bottle.  When bottle feeding, hold the baby in upright position. DO NOT prop a bottle to feed the baby.  Burp baby frequently. INFANT SAFETY    · When in a car, newborns need to ride in an appropriate car seat, rear facing, in the back seat. · NEVER leave baby unattended. · DO NOT smoke near a baby. · DO NOT sleep with baby in bed with you. · Pacifiers should be replaced every 3 months. · NEVER SHAKE A BABY!!    WHEN TO CALL THE DOCTOR  Referred parent(s)/Caregiver(s) to Patient PCP or other appropriate specialty physician  should questions arise after discharge. In the event of an emergency Parent(s)/Caregiver should contact their local emergency service or . · If the baby's temperature is less than 98 degrees or more than 100 degrees.   · If the baby is having trouble breathing, has forceful vomiting, green colored vomit, high pitched crying, or is constantly restless and very irritable. · If the baby has a rash lasting longer than 3 days. · If the baby has swelling, bleeding or drainage from circumcision site. · If the baby has diarrhea, water loss stools or is constipated (hard pellets or no bowel movement for greater than 3 days). · If the baby has bleeding, swelling, drainage, or an odor from the umbilical cord or a red [place] around the base of the cord. · If the baby has a yellow color to his/her skin or to the whites of the eyes. · If the baby has become blue around the mouth when crying or feeding, or becomes blue at any time. · If the baby has frequent yellow eye drainage. · If you are unable to arouse or awaken your baby. · If your baby has white patches in the mouth or bright red diaper rash. · If your baby does not want to wake to eat and has had less than 6 wet diapers in a day. · If your baby does not void within 12 hours after circumcision. Or any other concerns you have regarding your baby's well being. INFANT CARE    · Use the bulb syringe to remove nasal drainage and spit up. · The umbilical cord will fall off in approximately 2 weeks. Do not apply alcohol or pull it off. · Until the cord falls off and has healed, avoid getting the area wet; the baby should be given sponge baths, no tub baths. · You may sponge bath every other day, provide a warm area during the bath, free from drafts. You may use baby products, do not use powder. · Change diapers frequently and keep the diaper area clean to avoid diaper rash. · Dress the baby according to the weather. Typically infants need one additional layer of clothing than adults. · Wash females front to back. · Girl babies may have vaginal discharge that may even have a slight blood tinged color. This is normal  · Boy circumcision care: use petroleum jelly to circumcision area for 2-3 days. Circumcision should be completely healed in 5-7 days.   · Babies should have 6-8 wet diapers and 2 or more stool diapers per day after the first week. · Position the baby on it's back to sleep. · Infants should spend some time on their belly often throughout the day when awake and if an adult is close by; this helps the infant develop muscle and neck control. I have reviewed all [facility] Post-Birth Warning Signs and essential teaching points for pulmonary embolism, cardiac disease, hypertensive disorders of pregnancy, obstetric hemorrhage, venous thromboembolism, infection, and postpartum depression with the patient and FOB (support person) . I have informed the patient on when to call their healthcare provider and when to call 911. I have discussed with the patient  the importance of scheduling a follow-up visit with their physician, nurse practitioner or midwife and provided them with correct contact information for appointment. I have provided the patient with a copy of the \"Save Your Life\" handout. The patient has acknowledged receiving and understanding this education with her signature. ID bands checked. Discharge teaching complete, discharge instructions signed, & parent/guardian denies questions regarding infant care at time of discharge. Parents  verbalized understanding to follow-up with the pediatrician or family physician as  recommended on the discharge instructions. Mother verbalizes understanding to follow-up with babys care provider as instructed. Discharged in stable  condition to care of parents. Infant placed in rear facing car seat per parents.

## 2022-02-15 LAB — SURGICAL PATHOLOGY REPORT: NORMAL

## 2022-02-16 PROBLEM — Z82.79 FH: SPINA BIFIDA: Status: RESOLVED | Noted: 2021-08-17 | Resolved: 2022-02-12

## 2022-02-16 PROBLEM — O09.93 HRP (HIGH RISK PREGNANCY), THIRD TRIMESTER: Status: RESOLVED | Noted: 2022-02-10 | Resolved: 2022-02-16

## 2022-02-16 PROBLEM — O16.3 ELEVATED BLOOD PRESSURE AFFECTING PREGNANCY IN THIRD TRIMESTER, ANTEPARTUM: Status: RESOLVED | Noted: 2022-02-10 | Resolved: 2022-02-16

## 2022-02-17 ENCOUNTER — ROUTINE PRENATAL (OUTPATIENT)
Dept: OBGYN CLINIC | Age: 32
End: 2022-02-17
Payer: COMMERCIAL

## 2022-02-17 VITALS
DIASTOLIC BLOOD PRESSURE: 80 MMHG | HEART RATE: 75 BPM | HEIGHT: 67 IN | BODY MASS INDEX: 36.81 KG/M2 | SYSTOLIC BLOOD PRESSURE: 136 MMHG

## 2022-02-17 DIAGNOSIS — Z01.30 BP CHECK: Primary | ICD-10-CM

## 2022-02-17 PROCEDURE — 99211 OFF/OP EST MAY X REQ PHY/QHP: CPT | Performed by: STUDENT IN AN ORGANIZED HEALTH CARE EDUCATION/TRAINING PROGRAM

## 2022-02-24 ENCOUNTER — POSTPARTUM VISIT (OUTPATIENT)
Dept: OBGYN CLINIC | Age: 32
End: 2022-02-24

## 2022-02-24 VITALS
HEIGHT: 67 IN | HEART RATE: 79 BPM | SYSTOLIC BLOOD PRESSURE: 127 MMHG | WEIGHT: 213 LBS | BODY MASS INDEX: 33.43 KG/M2 | DIASTOLIC BLOOD PRESSURE: 82 MMHG

## 2022-02-24 PROCEDURE — 0503F POSTPARTUM CARE VISIT: CPT | Performed by: STUDENT IN AN ORGANIZED HEALTH CARE EDUCATION/TRAINING PROGRAM

## 2022-02-24 NOTE — PROGRESS NOTES
Postpartum Visit    Sonny Breaux is a 33 y.o. female , 2 weeks Post Partum s/p spontaneous vaginal delivery on 22    The patient was seen. She has no chief complaint today. Her pregnancy was complicated by gHTN and elevated 1 hr, normal 3 hr GTT. She is  breast feeding and denies signs or symptoms of mastitis. The patient completed the E.P.D.S. Post Partum Depression Evaluation form and scored 0. She does not have signs or symptoms of post partum depression. She denies any suicidal thoughts with a plan, intent to harm others and delusional ideas. She does admit to having good home support. Today her lochia is light she denies any dizziness or shortness of breath. Her bowels are regular and she denies any urinary tract symptomology. She is using abstinence for family planning and for STD prevention. Undecided on IUD at 6 week visit. OB History    Para Term  AB Living   1 1 1 0 0 1   SAB IAB Ectopic Molar Multiple Live Births   0 0 0 0 0 1   Obstetric Comments   1 step son      Patient Active Problem List   Diagnosis    Elevated 1 hr, normal 3 hr GTT    Gestational hypertension (G1)     22 F Apg 8/9 Wt 7#12       Vitals:   Blood pressure 127/82, pulse 79, height 5' 7\" (1.702 m), weight 213 lb (96.6 kg), last menstrual period 2021, unknown if currently breastfeeding. Physical Exam:  General:  no apparent distress, alert and cooperative  Lungs:  No increased work of breathing, good air exchange, clear to auscultation bilaterally, no crackles or wheezing  Heart:  regular rate and rhythm and no murmur    Abdomen: Abdomen soft, non-tender.  BS normal. No masses,  No organomegaly  Fundus: non-tender, normal size, firm, below umbilicus  Perineum: not inspected  Extremities:  no calf tenderness, non edematous    Assessment:  Sonny Breaux is a 33 y.o. female , 2 weeks Post Partum s/p spontaneous vaginal delivery on 22   - Stable, continue routine post partum care    Patient Active Problem List    Diagnosis Date Noted     22 F Apg 8/9 Wt 7#12 2022     Priority: High    Gestational hypertension (G1) 02/10/2022    Elevated 1 hr, normal 3 hr GTT 2022     Return in about 4 weeks (around 3/24/2022) for PP Visit. Counseling Completed:  · Signs & Symptoms of mastitis and when to notify office discussed.   · Secondary smoke risks including increased risks of respiratory problems, Sudden infant death syndrome, and potential malignancies discussed  · Abstinence, family planning counseling and STD counseling discussed  · Continue with post operative restrictions until 6 weeks post partum  · No heavy lifting or [place] until 6 weeks post partum    Katrina Velasco [id number] Ob[facility]   2022, 9:55 AM

## 2022-03-22 ENCOUNTER — OFFICE VISIT (OUTPATIENT)
Dept: FAMILY MEDICINE CLINIC | Age: 32
End: 2022-03-22
Payer: COMMERCIAL

## 2022-03-22 VITALS
DIASTOLIC BLOOD PRESSURE: 70 MMHG | WEIGHT: 202 LBS | HEART RATE: 90 BPM | HEIGHT: 66 IN | SYSTOLIC BLOOD PRESSURE: 116 MMHG | BODY MASS INDEX: 32.47 KG/M2 | OXYGEN SATURATION: 98 %

## 2022-03-22 DIAGNOSIS — Z13.0 SCREENING FOR DEFICIENCY ANEMIA: ICD-10-CM

## 2022-03-22 DIAGNOSIS — Z13.220 SCREENING FOR LIPID DISORDERS: ICD-10-CM

## 2022-03-22 DIAGNOSIS — Z13.1 SCREENING FOR DIABETES MELLITUS: ICD-10-CM

## 2022-03-22 DIAGNOSIS — Z76.89 ENCOUNTER TO ESTABLISH CARE WITH NEW DOCTOR: ICD-10-CM

## 2022-03-22 DIAGNOSIS — Z00.00 HEALTHCARE MAINTENANCE: Primary | ICD-10-CM

## 2022-03-22 PROCEDURE — 99385 PREV VISIT NEW AGE 18-39: CPT | Performed by: INTERNAL MEDICINE

## 2022-03-22 ASSESSMENT — ENCOUNTER SYMPTOMS
ALLERGIC/IMMUNOLOGIC NEGATIVE: 1
RESPIRATORY NEGATIVE: 1
EYES NEGATIVE: 1
GASTROINTESTINAL NEGATIVE: 1

## 2022-03-22 ASSESSMENT — PATIENT HEALTH QUESTIONNAIRE - PHQ9
SUM OF ALL RESPONSES TO PHQ QUESTIONS 1-9: 0
SUM OF ALL RESPONSES TO PHQ QUESTIONS 1-9: 0
1. LITTLE INTEREST OR PLEASURE IN DOING THINGS: 0
SUM OF ALL RESPONSES TO PHQ QUESTIONS 1-9: 0
SUM OF ALL RESPONSES TO PHQ9 QUESTIONS 1 & 2: 0
SUM OF ALL RESPONSES TO PHQ QUESTIONS 1-9: 0
2. FEELING DOWN, DEPRESSED OR HOPELESS: 0

## 2022-03-22 NOTE — PROGRESS NOTES
[address]  [address] [facility] [address]  [facility] [address]      Date of Visit:  3/22/2022  Patient Name: Raj Spence   Patient :  1990     CHIEF COMPLAINT:     Raj Spence is a 33 y.o. female who presents today for an general visit to be evaluated for the following condition(s):  Chief Complaint   Patient presents with   [address] Annual Exam       REVIEW OF SYSTEM      Review of Systems   Constitutional: Negative. HENT: Negative. Eyes: Negative. Respiratory: Negative. Cardiovascular: Negative. Gastrointestinal: Negative. Endocrine: Negative. Genitourinary: Negative. Musculoskeletal: Negative. Skin: Negative. Allergic/Immunologic: Negative. Neurological: Negative. Hematological: Negative. Psychiatric/Behavioral: Negative. All other systems reviewed and are negative. HISTORY OF PRESENT ILLNESS     HPI   Patient here to establish care and for annual check up. Overall doing well. No concerns today. She just needs to establish with PCP. She is 5 weeks post partum after delivery of healthy baby girl. She is doing well - no issues with post partum depression.         REVIEWED INFORMATION      Allergies   Allergen Reactions    Latex Rash     Not every time, causes irritation        Patient Active Problem List   Diagnosis    Elevated 1 hr, normal 3 hr GTT    Gestational hypertension (G1)     22 F Apg 8/9 Wt 7#12    Healthcare maintenance       Past Medical History:   Diagnosis Date    Atypical squamous cells of undetermined significance (ASCUS) on Papanicolaou smear of cervix 2019    HPV+     Gestational hypertension 2/10/2022       Past Surgical History:   Procedure Laterality Date    TONSILLECTOMY      WISDOM TOOTH EXTRACTION  2019        Social History     Socioeconomic History    Marital status: Single     Spouse name: None    Number of children: None    Years of education: None    Highest education level: None   Occupational History    None   Tobacco Use    Smoking status: Former Smoker     Start date:      Quit date: 2017     Years since quittin.2    Smokeless tobacco: Never Used   Vaping Use    Vaping Use: Never used   Substance and Sexual Activity    Alcohol use: No    Drug use: No    Sexual activity: Yes     Partners: Male   Other Topics Concern    None   Social History Narrative    None     Social Determinants of Health     Financial Resource Strain:     Difficulty of Paying Living Expenses: Not on file   Food Insecurity:     Worried About Running Out of Food in the Last Year: Not on file    Cecille of Food in the Last Year: Not on file   Transportation Needs:     Lack of Transportation (Medical): Not on file    Lack of Transportation (Non-Medical):  Not on file   Physical Activity:     Days of Exercise per Week: Not on file    Minutes of Exercise per Session: Not on file   Stress:     Feeling of Stress : Not on file   Social Connections:     Frequency of Communication with Friends and Family: Not on file    Frequency of Social Gatherings with Friends and Family: Not on file    Attends Synagogue Services: Not on file    Active Member of [address] [place] [address] [employer] or Organizations: Not on file    Attends Club or Organization Meetings: Not on file    Marital Status: Not on file   Intimate Partner Violence:     Fear of Current or Ex-Partner: Not on file    Emotionally Abused: Not on file    Physically Abused: Not on file    Sexually Abused: Not on file   Housing Stability:     Unable to Pay for Housing in the Last Year: Not on file    Number of [place] in the Last Year: Not on file    Unstable Housing in the Last Year: Not on file        Family History   Problem Relation Age of Onset    No Known Problems Mother     Heart Disease Father     Hypertension Father 39    Allergies Brother         seasonal    Asthma Brother  Colon Cancer Maternal Grandmother 61    Kidney Disease Maternal Grandmother     Diabetes Maternal Grandmother 61        IDDM    Hypertension Maternal Grandmother     Colon Cancer Maternal Grandfather 61    Diabetes Maternal Grandfather 61        IDDM    Heart Disease Maternal Grandfather         pacemaker    Hypertension Maternal Grandfather     Breast Cancer Paternal Grandmother 77        bilateral mastectomy, BRCA+(all of her sisters had br Ca 3 out 5 with br ca    Cancer Paternal Grandmother 76        Lympoma    High Blood Pressure Paternal Grandmother     Heart Attack Paternal Grandfather     Hypertension Paternal Grandfather        PHYSICAL EXAM     /70   Pulse 90   Ht 5' 6\" (1.676 m)   Wt 202 lb (91.6 kg)   SpO2 98%   BMI 32.60 kg/m²    Physical Exam  Vitals and nursing note reviewed. Constitutional:       Appearance: Normal appearance. She is normal weight. HENT:      Head: Normocephalic and atraumatic. Right Ear: External ear normal.      Left Ear: External ear normal.      Nose: Nose normal.      Mouth/Throat:      Mouth: Mucous membranes are moist.   Eyes:      Extraocular Movements: Extraocular movements intact. Conjunctiva/sclera: Conjunctivae normal.   Cardiovascular:      Rate and Rhythm: Normal rate and regular rhythm. Pulses: Normal pulses. Heart sounds: Normal heart sounds. Pulmonary:      Effort: Pulmonary effort is normal.      Breath sounds: Normal breath sounds. Abdominal:      General: Abdomen is flat. Bowel sounds are normal.      Palpations: Abdomen is soft. Musculoskeletal:         General: Normal range of motion. Cervical back: Normal range of motion and neck supple. Skin:     General: Skin is warm. Capillary Refill: Capillary refill takes less than 2 seconds. Neurological:      General: No focal deficit present. Mental Status: She is alert and oriented to person, place, and time.    Psychiatric:         Mood and Affect: Mood normal.         Behavior: Behavior normal.         Thought Content: Thought content normal.         Judgment: Judgment normal.         ASSESSMENT/PLAN     1. Screening for diabetes mellitus  - Comprehensive Metabolic Panel, Fasting; Future    2. Screening for lipid disorders  - Lipid, Fasting; Future    3. Screening for deficiency anemia  - CBC with Auto Differential; Future    4. Healthcare maintenance    5. Encounter to establish care with new doctor    Record reviewed    Patient here to establish care and for annual check up. Overall doing well. No concerns today. She just needs to establish with PCP. She is 5 weeks post partum after delivery of healthy baby girl. She is doing well - no issues with post partum depression. Healthcare maintenance - screening labs ordered, to get drawn in the summer, I anticipate her cholesterol may be a little high after delivery, cont healthy diet and activity, childhood immunizations are up to date. Return for follow up annually or sooner if needed.     COMMUNICATION:       Electronically signed by Dorcas Ryder MD on 3/22/2022 at 11:46 AM

## 2022-03-25 ENCOUNTER — POSTPARTUM VISIT (OUTPATIENT)
Dept: OBGYN CLINIC | Age: 32
End: 2022-03-25

## 2022-03-25 VITALS
DIASTOLIC BLOOD PRESSURE: 69 MMHG | HEART RATE: 62 BPM | HEIGHT: 66 IN | SYSTOLIC BLOOD PRESSURE: 113 MMHG | WEIGHT: 204 LBS | BODY MASS INDEX: 32.78 KG/M2 | OXYGEN SATURATION: 98 %

## 2022-03-25 DIAGNOSIS — Z30.431 SURVEILLANCE FOR BIRTH CONTROL, INTRAUTERINE DEVICE: ICD-10-CM

## 2022-03-25 PROCEDURE — 0503F POSTPARTUM CARE VISIT: CPT | Performed by: STUDENT IN AN ORGANIZED HEALTH CARE EDUCATION/TRAINING PROGRAM

## 2022-03-25 NOTE — PROGRESS NOTES
Postpartum Visit    Amita Masters is a 33 y.o. female , 6 weeks Post Partum s/p spontaneous vaginal delivery on 22    The patient was seen. She has no chief complaint today. Her pregnancy was complicated by gHTN and elevated 1 hr, normal 3 hr GTT. She is  breast feeding and denies signs or symptoms of mastitis. The patient completed the E.P.D.S. Post Partum Depression Evaluation form and scored 0. She does not have signs or symptoms of post partum depression. She denies any suicidal thoughts with a plan, intent to harm others and delusional ideas. She does admit to having good home support. Today her lochia is light she denies any dizziness or shortness of breath. Her bowels are regular and she denies any urinary tract symptomology. She is undecided on contraception at this time. Counseling provided. OB History    Para Term  AB Living   1 1 1 0 0 1   SAB IAB Ectopic Molar Multiple Live Births   0 0 0 0 0 1   Obstetric Comments   1 step son      Patient Active Problem List   Diagnosis    Elevated 1 hr, normal 3 hr GTT    Gestational hypertension (G1)     22 F Apg 8/9 Wt 7#12    Healthcare maintenance       Vitals:   Blood pressure 113/69, pulse 62, height 5' 6\" (1.676 m), weight 204 lb (92.5 kg), SpO2 98 %, currently breastfeeding. Physical Exam:  General:  no apparent distress, alert and cooperative  Lungs:  No increased work of breathing, good air exchange, clear to auscultation bilaterally, no crackles or wheezing  Heart:  regular rate and rhythm and no murmur    Abdomen: Abdomen soft, non-tender.  BS normal. No masses,  No organomegaly  Fundus: non-tender, normal size, firm, below umbilicus  Perineum: not inspected  Extremities:  no calf tenderness, non edematous    Assessment:  Amita Masters is a 33 y.o. female , 6 weeks Post Partum s/p spontaneous vaginal delivery on 22   - Stable, discontinue routine post partum care    Patient Active Problem List    Diagnosis Date Noted     22 F Apg 8/9 Wt 7#12 2022     Priority: [place] maintenance 2022    Gestational hypertension (G1) 02/10/2022    Elevated 1 hr, normal 3 hr GTT 2022     Return in about 6 months (around 2022) for Annual.    Counseling Completed:  · Family planning counseling and STD counseling discussed  · Discontinue with postpartum restrictions  · Can resume heavy lifting and [place]    Katrina Tay [facility] Ob/Gyn   3/25/2022, 11:11 AM

## 2022-04-21 PROBLEM — Z00.00 HEALTHCARE MAINTENANCE: Status: RESOLVED | Noted: 2022-03-22 | Resolved: 2022-04-21

## 2022-10-04 ENCOUNTER — OFFICE VISIT (OUTPATIENT)
Dept: OBGYN CLINIC | Age: 32
End: 2022-10-04
Payer: COMMERCIAL

## 2022-10-04 VITALS
HEIGHT: 65 IN | SYSTOLIC BLOOD PRESSURE: 124 MMHG | WEIGHT: 175 LBS | BODY MASS INDEX: 29.16 KG/M2 | DIASTOLIC BLOOD PRESSURE: 76 MMHG | HEART RATE: 90 BPM

## 2022-10-04 DIAGNOSIS — Z01.419 WELL WOMAN EXAM: Primary | ICD-10-CM

## 2022-10-04 PROCEDURE — 99395 PREV VISIT EST AGE 18-39: CPT | Performed by: CLINICAL NURSE SPECIALIST

## 2022-10-04 SDOH — ECONOMIC STABILITY: FOOD INSECURITY: WITHIN THE PAST 12 MONTHS, THE FOOD YOU BOUGHT JUST DIDN'T LAST AND YOU DIDN'T HAVE MONEY TO GET MORE.: NEVER TRUE

## 2022-10-04 SDOH — ECONOMIC STABILITY: FOOD INSECURITY: WITHIN THE PAST 12 MONTHS, YOU WORRIED THAT YOUR FOOD WOULD RUN OUT BEFORE YOU GOT MONEY TO BUY MORE.: NEVER TRUE

## 2022-10-04 ASSESSMENT — SOCIAL DETERMINANTS OF HEALTH (SDOH): HOW HARD IS IT FOR YOU TO PAY FOR THE VERY BASICS LIKE FOOD, HOUSING, MEDICAL CARE, AND HEATING?: NOT HARD AT ALL

## 2022-10-04 NOTE — PROGRESS NOTES
[address] [facility] [address]  [address]  Dept: [phone]        DATE OF VISIT:  10/4/22        History and Physical    Tyler West    :  1990  CHIEF COMPLAINT:    Chief Complaint   Patient presents with    Annual Exam                    Tyler West is a 33 y.o. female who presents for annual well woman exam. Patient states that she is having discomfort in her hips with activity and notices the right one pops throughout the day. The patient was seen and examined. Per the patient bowels areregular. She has no voiding complaints. She denies any bloating as well as vaginal discharge. Chaperone for Intimate Exam  Chaperone was offered as part of the rooming process. Patient declined and agrees to continue with exam without a chaperone.   Chaperone: none     _____________________________________________________________________  Past Medical History:   Diagnosis Date    Atypical squamous cells of undetermined significance (ASCUS) on Papanicolaou smear of cervix 2019    HPV+     Gestational hypertension 2/10/2022                                                                   Past Surgical History:   Procedure Laterality Date    TONSILLECTOMY      WISDOM TOOTH EXTRACTION       Family History   Problem Relation Age of Onset    No Known Problems Mother     Heart Disease Father     Hypertension Father 39    Allergies Brother         seasonal    Asthma Brother     Colon Cancer Maternal Grandmother 61    Kidney Disease Maternal Grandmother     Diabetes Maternal Grandmother 61        IDDM    Hypertension Maternal Grandmother     Colon Cancer Maternal Grandfather 61    Diabetes Maternal Grandfather 61        IDDM    Heart Disease Maternal Grandfather         pacemaker    Hypertension Maternal Grandfather     Breast Cancer Paternal Grandmother 77        bilateral mastectomy, BRCA+(all of her sisters had br Ca 3 out 5 with br ca    Cancer Paternal Grandmother 76        Lympoma    High Blood Pressure Paternal Grandmother     Heart Attack Paternal Grandfather     Hypertension Paternal Grandfather      Social History     Tobacco Use   Smoking Status Former    Types: Cigarettes    Start date:     Quit date: 2017    Years since quittin.7   Smokeless Tobacco Never     Social History     Substance and Sexual Activity   Alcohol Use No     Current Outpatient Medications   Medication Sig Dispense Refill    Prenatal Vit-Fe Fumarate-FA (PRENATAL PO) Take by mouth       No current facility-administered medications for this visit. Allergies: Allergies   Allergen Reactions    Latex Rash     Not every time, causes irritation        Gynecologic History:  Patient's last menstrual period was 04/15/2021.   Sexually Active: No  STD History:No  Birth Control: No    OB History    Para Term  AB Living   1 1 1 0 0 1   SAB IAB Ectopic Molar Multiple Live Births   0 0 0 0 0 1   Obstetric Comments   1 step son      ______________________________________________________________________    Review of Systems    REVIEW OFSYSTEMS:        Constitutional:  Unexpected weight change, extreme fatigue, night sweats              no  Skin:                           Rashes, moles   no  Neurological:  Frequentheadaches, seizures         no  Ophthalmic:  Recent visual changes no  ENT:   Difficulty swallowing  no  Breast:              Masses, pain, nipple discharge                            No; currently breastfeeding      Respiratory:  Shortness of breath, coughing           no    Cardiovascular: Chest pain   no     Gastrointestinal: Chronic diarrhea/constipation, nausea/vomiting           no   Urogenital:  Urinary incontinence, frequency, urgency          no                                         Heavy/irregular periods           yes; last period was 2021                                      Vaginal discharge no  Hematological: Bruises easy   no     Endocrine:  Hot flashes   no     Hot/Cold Intolerance  no    Psychological:            Mood and affect were within normal limits. no                 Physical Exam    Physical Exam:    Vitals:    10/04/22 0852   BP: 124/76   Pulse: 90   Weight: 175 lb (79.4 kg)   Height: 5' 5\" (1.651 m)       General Appearance: This  is a well developed, well nourished, well groomed female. Her BMI was reviewed. Nutritional decision making andexercise were discussed. Neurological:  The patient is alert and oriented to time,place, person, and situation    Skin:  A brief inspection of the skin revealed no rashes or lesions. Neck:  The neck was supple. Respiratory: There was unlabored respiratory effort. Lungs clear to ascultation. Cardiovascular: The patients extremities were without calf tenderness or edema. Heart with a regular rate and rhythm. Abdomen: The abdomen was soft and non-tender with no guarding, rebound or rigidity. No hernias were appreciated. Breast:   The patients breasts were symmetrical.  There were no masses, discharge or retractions noted. Self breast exams were reviewed. Pelvic Exam:  The external genitalia was with a normal appearance. The vaginal vault was normal. There were no cystocele, rectocele, or enterocele appreciated. There was no vaginal discharge. The cervix was without lesions. There was no cervical motion tenderness. The uterus was mobile, midline and regular. The adnexa no fullness, tenderness or masses appreciated. ASSESSMENT: Normal annual well    33 y.o. Female; Annual   Diagnosis Orders   1. Well woman exam                        PLAN:  - Discussed new papsmear guidelines. - Birth control Discussed. - Smoking risk factors Discussed  - Diet and exercise reviewed.    - Routine healthmaintenance per patients PCP.  - Return to clinic in 1 year or earlier with questions, problems, concerns. Return for 1 year for Annual and as needed.         Electronically signed by JORJE Avilez CNP on 10/4/2022 at 9:22 AM

## 2023-10-05 NOTE — PROGRESS NOTES
[facility]  [facility] - [address] [place] [address] [address]  Dept: [phone]           Patient: Gautam Beck  Primary Care Physician: No primary care provider on file. Chief Complaint   Patient presents with    Annual Exam     Prev Pap: 21 WNL     HPI: Gautam Beck is a 29 y.o. Jesus Bias who presents today for her annual women's wellness exam. Has an 35 year old, she is still breastfeeding at night. 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 For activity, she plays softball in summer and fall. She bowls in the winter. She is interested in a referral to the high risk breast center. Her paternal grandmother and all four of her grandmother's sisters had breast cancer. Additionally, Junie's aunt just had a breast biopsy and results are not available yet. Her grandmother did have a genetic mutation but she cannot recall if it was brca 1 or 2. OBSTETRICAL & GYNECOLOGICAL HISTORY:  OB History    Para Term  AB Living   1 1 1 0 0 1   SAB IAB Ectopic Molar Multiple Live Births   0 0 0 0 0 1      # Outcome Date GA Lbr Kd/2nd Weight Sex Delivery Anes PTL Lv   1 Term 22 38w3d / 01:06 3.52 kg (7 lb 12.2 oz) F Vag-Spont EPI N LEYDI      Name: Robb Reinoso: [place]  Apgar5: 9      Obstetric Comments   1 step son      Age of Menarche: 16 or so  She has a history of irregular periods  Her periods returned when her daughter was approximately one. Her periods are mostly regular, and last 4 days. Bleeding is heavy for the first two days. Filling a pad/tampon in 3 hours at times. She denies intolerable dysmenorrhea. Patient's last menstrual period was 10/31/2023. Sexually Active: with   Dyspareunia: No  STD History: No  Birth Control: condoms and pull out method. ..  planning to get a vasectomy next year  History of abnormal pap smear/

## 2023-11-16 ENCOUNTER — HOSPITAL ENCOUNTER (OUTPATIENT)
Age: 33
Setting detail: SPECIMEN
Discharge: HOME | End: 2023-11-16

## 2023-11-16 ENCOUNTER — OFFICE VISIT (OUTPATIENT)
Dept: OBGYN CLINIC | Age: 33
End: 2023-11-16
Payer: COMMERCIAL

## 2023-11-16 VITALS
BODY MASS INDEX: 34.66 KG/M2 | SYSTOLIC BLOOD PRESSURE: 123 MMHG | WEIGHT: 208 LBS | DIASTOLIC BLOOD PRESSURE: 79 MMHG | HEIGHT: 65 IN | HEART RATE: 71 BPM

## 2023-11-16 DIAGNOSIS — Z80.3 FAMILY HISTORY OF BREAST CANCER: ICD-10-CM

## 2023-11-16 DIAGNOSIS — Z80.41 FAMILY HISTORY OF OVARIAN CANCER: ICD-10-CM

## 2023-11-16 DIAGNOSIS — Z80.0 FAMILY HISTORY OF COLON CANCER: ICD-10-CM

## 2023-11-16 DIAGNOSIS — Z01.419 ENCOUNTER FOR GYNECOLOGICAL EXAMINATION: Primary | ICD-10-CM

## 2023-11-16 PROCEDURE — G8484 FLU IMMUNIZE NO ADMIN: HCPCS

## 2023-11-16 PROCEDURE — 99395 PREV VISIT EST AGE 18-39: CPT

## 2023-11-19 LAB
HPV I/H RISK 4 DNA CVX QL NAA+PROBE: NOT DETECTED
HPV SAMPLE: NORMAL
HPV, INTERPRETATION: NORMAL
HPV16 DNA CVX QL NAA+PROBE: NOT DETECTED
HPV18 DNA CVX QL NAA+PROBE: NOT DETECTED
SPECIMEN DESCRIPTION: NORMAL

## 2023-12-01 LAB — CYTOLOGY REPORT: NORMAL

## 2023-12-05 ENCOUNTER — INITIAL CONSULT (OUTPATIENT)
Dept: ONCOLOGY | Age: 33
End: 2023-12-05

## 2023-12-05 DIAGNOSIS — Z80.3 FAMILY HISTORY OF BREAST CANCER: Primary | ICD-10-CM

## 2023-12-08 NOTE — PROGRESS NOTES
as well as the option of prophylactic mastectomy  2) Possible recommendations for prophylactic oophorectomy for results which suggest an increased risk for ovarian cancer  3) Possible recommendations for prophylactic hysterectomy for results which suggest an increased risk for endometrial cancer  4) Increased colon cancer surveillance by colonoscopy screening every 1-2 years beginning by age 22-19y    Lastly, we discussed that the results of Ms. Rupesh Warren genetic testing may be beneficial in defining her risk for cancer as well as for her family members. SUMMARY & PLAN  1) Ms. Burris meets the NCCN criteria for genetic testing based on her paternal family history of breast cancer. 2) Genetic testing via a multi-gene panel was recommended and offered to Ms. Urvashi Benitez. 3) Ms. Burris elected to proceed with the CancerNext Expanded + RNA Insight Hereditary Cancer Gene Panel. 4) Ms. Urvashi Benitez is aware that she will receive a notification from the laboratory [facility]) if the out of pocket cost for testing exceeds $100 (based on individual insurance plan) and the alternative option to proceed with the self pay price of $250.     5) Informed consent was obtained and a blood sample was sent to [facility]. We will call Ms. Urvashi Benitez with results as soon as they are available. A follow up appointment may be recommended. A summary letter with results and final medical management recommendations will be sent once available. A total of 30 minutes were spent face to face with Ms. Urvashi Benitez and 50% of the time was spent educating and counseling. The [address] [place] [address] Program would be glad to offer our assistance should you have any questions or concerns about this information. Please feel free to contact us at [phone]. Christopher Jin.  Silvino Tucker, MS, [facility]   Licensed Genetic Counselor

## 2023-12-27 ENCOUNTER — TELEPHONE (OUTPATIENT)
Dept: ONCOLOGY | Age: 33
End: 2023-12-27

## 2024-11-19 ENCOUNTER — OFFICE VISIT (OUTPATIENT)
Dept: OBGYN CLINIC | Age: 34
End: 2024-11-19
Payer: COMMERCIAL

## 2024-11-19 VITALS
DIASTOLIC BLOOD PRESSURE: 80 MMHG | HEIGHT: 65 IN | BODY MASS INDEX: 34.99 KG/M2 | SYSTOLIC BLOOD PRESSURE: 135 MMHG | HEART RATE: 75 BPM | WEIGHT: 210 LBS

## 2024-11-19 DIAGNOSIS — Z30.09 ENCOUNTER FOR COUNSELING REGARDING CONTRACEPTION: ICD-10-CM

## 2024-11-19 DIAGNOSIS — Z01.419 ENCOUNTER FOR WELL WOMAN EXAM WITH ROUTINE GYNECOLOGICAL EXAM: Primary | ICD-10-CM

## 2024-11-19 PROCEDURE — 99395 PREV VISIT EST AGE 18-39: CPT

## 2024-11-19 PROCEDURE — G8484 FLU IMMUNIZE NO ADMIN: HCPCS

## 2024-11-19 NOTE — PROGRESS NOTES
[facility]  [address]  [address]  [facility] [address]  Dept: [phone]           Annual gynecologic exam    Patient: Junie Burris  Primary Care Physician: No primary care provider on file.   Chief Complaint   Patient presents with    Annual Exam     Pelvic exam     HPI: Junie Burris is a 34 y.o.  who presents today as a returning patient for her annual women's wellness exam.  Has an 2.5 year old, no longer breastfeeding. 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 For activity, she plays softball in summer and fall.     Wants to discuss birth control. She tried a contraceptive pill previously. She is not interested in an iud    Her paternal grandmother and all four of her grandmother's sisters had breast cancer. Additionally, Her paternal grandmother did have a genetic mutation but she cannot recall if it was brca 1 or 2.     OBSTETRICAL & GYNECOLOGICAL HISTORY:  OB History    Para Term  AB Living   1 1 1 0 0 1   SAB IAB Ectopic Molar Multiple Live Births   0 0 0 0 0 1      # Outcome Date GA Lbr Kd/2nd Weight Sex Type Anes PTL Lv   1 Term 22 38w3d / 01:06 3.52 kg (7 lb 12.2 oz) F Vag-Spont EPI N LEYDI      Name: DEBBIEBABY ELIZA MANRIQUE      Apgar1: 8  Apgar5: 9      Obstetric Comments   1 step son      Age of Menarche: 14.  Her periods are regular, and last 4-7 days. Bleeding is heavy. Using flex disc and changes twice daily. She complains of mostly tolerable dysmenorrhea. Sporadically will have a more painful cycle.   Patient's last menstrual period was 2024.  Sexually Active: with   Dyspareunia: No  Birth Control: condoms and coitus interruptus    History of abnormal pap smear/ Colposcopy/ LEEP procedure: 2019 ascus and hpv, normal since    FAMILY HISTORY:  Family History of Breast, Ovarian, Colon or Uterine Cancer: Yes    Paternal Great aunt had

## 2025-01-07 SDOH — HEALTH STABILITY: PHYSICAL HEALTH: ON AVERAGE, HOW MANY DAYS PER WEEK DO YOU ENGAGE IN MODERATE TO STRENUOUS EXERCISE (LIKE A BRISK WALK)?: 0 DAYS

## 2025-01-10 ENCOUNTER — OFFICE VISIT (OUTPATIENT)
Dept: PRIMARY CARE CLINIC | Age: 35
End: 2025-01-10
Payer: COMMERCIAL

## 2025-01-10 VITALS
SYSTOLIC BLOOD PRESSURE: 126 MMHG | BODY MASS INDEX: 34.27 KG/M2 | HEIGHT: 66 IN | RESPIRATION RATE: 15 BRPM | HEART RATE: 88 BPM | DIASTOLIC BLOOD PRESSURE: 68 MMHG | OXYGEN SATURATION: 96 % | WEIGHT: 213.2 LBS

## 2025-01-10 DIAGNOSIS — Z13.1 SCREENING FOR DIABETES MELLITUS (DM): ICD-10-CM

## 2025-01-10 DIAGNOSIS — Z00.00 WELL ADULT EXAM: Primary | ICD-10-CM

## 2025-01-10 DIAGNOSIS — Z13.6 SCREENING FOR CARDIOVASCULAR CONDITION: ICD-10-CM

## 2025-01-10 DIAGNOSIS — Z13.29 SCREENING FOR THYROID DISORDER: ICD-10-CM

## 2025-01-10 DIAGNOSIS — Z13.0 SCREENING, ANEMIA, DEFICIENCY, IRON: ICD-10-CM

## 2025-01-10 PROCEDURE — 99395 PREV VISIT EST AGE 18-39: CPT | Performed by: NURSE PRACTITIONER

## 2025-01-10 SDOH — ECONOMIC STABILITY: FOOD INSECURITY: WITHIN THE PAST 12 MONTHS, THE FOOD YOU BOUGHT JUST DIDN'T LAST AND YOU DIDN'T HAVE MONEY TO GET MORE.: NEVER TRUE

## 2025-01-10 SDOH — ECONOMIC STABILITY: FOOD INSECURITY: WITHIN THE PAST 12 MONTHS, YOU WORRIED THAT YOUR FOOD WOULD RUN OUT BEFORE YOU GOT MONEY TO BUY MORE.: NEVER TRUE

## 2025-01-10 ASSESSMENT — PATIENT HEALTH QUESTIONNAIRE - PHQ9
SUM OF ALL RESPONSES TO PHQ9 QUESTIONS 1 & 2: 0
SUM OF ALL RESPONSES TO PHQ QUESTIONS 1-9: 0
2. FEELING DOWN, DEPRESSED OR HOPELESS: NOT AT ALL
SUM OF ALL RESPONSES TO PHQ QUESTIONS 1-9: 0
1. LITTLE INTEREST OR PLEASURE IN DOING THINGS: NOT AT ALL
SUM OF ALL RESPONSES TO PHQ QUESTIONS 1-9: 0
SUM OF ALL RESPONSES TO PHQ QUESTIONS 1-9: 0

## 2025-01-10 NOTE — PROGRESS NOTES
[facility]  [facility]  [address] [place] [address]  [address]  [facility] [address]  Dept: [phone]  Dept Fax: [phone]    Junie Burris is a 34 y.o. female who presentstoday for her medical conditions/complaints as noted below.  Junie Burris is c/o of  Chief Complaint   Patient presents with    New Patient     Establish Care         HPI:     History of Present Illness  The patient presents for an annual exam.    She has been without a consistent primary care provider for several years, with her only regular medical care coming from her obstetrician's office. She is not on any daily medications and has no significant medical history apart from pregnancy-related conditions. She reports no history of thyroid issues. She experiences occasional chest flutters, which are brief and often accompanied by a cough. These episodes are not associated with physical activity, anxiety, or caffeine intake, but may be related to dehydration. Her caffeine consumption has decreased since her pregnancy, now limited to a morning iced coffee and a can of soda throughout the day. She also maintains hydration by carrying a 40-ounce water bottle, which she refills at least once daily. She reports no personal history of cardiac issues. Her blood pressure was slightly elevated during her pregnancy but has since returned to normal levels. She acknowledges the need for improvement in her diet.      No results found for: \"LABA1C\"          ( goal A1C is < 7)   No components found for: \"LABMICR\"  No components found for: \"LDLCHOLESTEROL\", \"LDLCALC\"    (goal LDL is <100)   AST (U/L)   Date Value   02/10/2022 16     ALT (U/L)   Date Value   02/10/2022 19     BUN (mg/dL)   Date Value   02/10/2022 5 (L)     BP Readings from Last 3 Encounters:   01/10/25 126/68   11/19/24 135/80   11/16/23 123/79          ([id number])    Past Medical History:   Diagnosis Date    Atypical squamous cells of undetermined significance

## 2025-02-14 ENCOUNTER — HOSPITAL ENCOUNTER (OUTPATIENT)
Age: 35
Setting detail: SPECIMEN
Discharge: HOME | End: 2025-02-14

## 2025-02-14 DIAGNOSIS — Z13.1 SCREENING FOR DIABETES MELLITUS (DM): ICD-10-CM

## 2025-02-14 DIAGNOSIS — Z13.6 SCREENING FOR CARDIOVASCULAR CONDITION: ICD-10-CM

## 2025-02-14 DIAGNOSIS — Z13.0 SCREENING, ANEMIA, DEFICIENCY, IRON: ICD-10-CM

## 2025-02-14 DIAGNOSIS — Z13.29 SCREENING FOR THYROID DISORDER: ICD-10-CM

## 2025-02-14 LAB
ALBUMIN SERPL-MCNC: 4.6 G/DL (ref 3.5–5.2)
ALBUMIN/GLOB SERPL: 1.5 {RATIO} (ref 1–2.5)
ALP SERPL-CCNC: 77 U/L (ref 35–104)
ALT SERPL-CCNC: 27 U/L (ref 10–35)
ANION GAP SERPL CALCULATED.3IONS-SCNC: 11 MMOL/L (ref 9–16)
AST SERPL-CCNC: 20 U/L (ref 10–35)
BASOPHILS # BLD: 0.04 K/UL (ref 0–0.2)
BASOPHILS NFR BLD: 1 % (ref 0–2)
BILIRUB SERPL-MCNC: 0.4 MG/DL (ref 0–1.2)
BUN SERPL-MCNC: 10 MG/DL (ref 6–20)
CALCIUM SERPL-MCNC: 9.5 MG/DL (ref 8.6–10.4)
CHLORIDE SERPL-SCNC: 102 MMOL/L (ref 98–107)
CHOLEST SERPL-MCNC: 223 MG/DL (ref 0–199)
CHOLESTEROL/HDL RATIO: 5.4
CO2 SERPL-SCNC: 24 MMOL/L (ref 20–31)
CREAT SERPL-MCNC: 1 MG/DL (ref 0.6–0.9)
EOSINOPHIL # BLD: 0.16 K/UL (ref 0–0.44)
EOSINOPHILS RELATIVE PERCENT: 2 % (ref 1–4)
ERYTHROCYTE [DISTWIDTH] IN BLOOD BY AUTOMATED COUNT: 13.4 % (ref 11.8–14.4)
GFR, ESTIMATED: 76 ML/MIN/1.73M2
GLUCOSE SERPL-MCNC: 97 MG/DL (ref 74–99)
HCT VFR BLD AUTO: 43.3 % (ref 36.3–47.1)
HDLC SERPL-MCNC: 41 MG/DL
HGB BLD-MCNC: 13.7 G/DL (ref 11.9–15.1)
IMM GRANULOCYTES # BLD AUTO: 0.03 K/UL (ref 0–0.3)
IMM GRANULOCYTES NFR BLD: 0 %
LDLC SERPL CALC-MCNC: 163 MG/DL (ref 0–100)
LYMPHOCYTES NFR BLD: 2.86 K/UL (ref 1.1–3.7)
LYMPHOCYTES RELATIVE PERCENT: 35 % (ref 24–43)
MCH RBC QN AUTO: 27.5 PG (ref 25.2–33.5)
MCHC RBC AUTO-ENTMCNC: 31.6 G/DL (ref 28.4–34.8)
MCV RBC AUTO: 86.8 FL (ref 82.6–102.9)
MONOCYTES NFR BLD: 0.67 K/UL (ref 0.1–1.2)
MONOCYTES NFR BLD: 8 % (ref 3–12)
NEUTROPHILS NFR BLD: 54 % (ref 36–65)
NEUTS SEG NFR BLD: 4.39 K/UL (ref 1.5–8.1)
NRBC BLD-RTO: 0 PER 100 WBC
PLATELET # BLD AUTO: 285 K/UL (ref 138–453)
PMV BLD AUTO: 12 FL (ref 8.1–13.5)
POTASSIUM SERPL-SCNC: 4.4 MMOL/L (ref 3.7–5.3)
PROT SERPL-MCNC: 7.7 G/DL (ref 6.6–8.7)
RBC # BLD AUTO: 4.99 M/UL (ref 3.95–5.11)
SODIUM SERPL-SCNC: 137 MMOL/L (ref 136–145)
TRIGL SERPL-MCNC: 95 MG/DL
TSH SERPL DL<=0.05 MIU/L-ACNC: 1.81 UIU/ML (ref 0.27–4.2)
VLDLC SERPL CALC-MCNC: 19 MG/DL (ref 1–30)
WBC OTHER # BLD: 8.2 K/UL (ref 3.5–11.3)

## 2025-02-19 ENCOUNTER — TELEPHONE (OUTPATIENT)
Dept: ONCOLOGY | Age: 35
End: 2025-02-19

## 2025-08-26 ENCOUNTER — OFFICE VISIT (OUTPATIENT)
Dept: FAMILY MEDICINE CLINIC | Age: 35
End: 2025-08-26
Payer: COMMERCIAL

## 2025-08-26 VITALS
OXYGEN SATURATION: 97 % | RESPIRATION RATE: 16 BRPM | HEART RATE: 67 BPM | TEMPERATURE: 97.8 F | BODY MASS INDEX: 36.81 KG/M2 | DIASTOLIC BLOOD PRESSURE: 78 MMHG | SYSTOLIC BLOOD PRESSURE: 116 MMHG | WEIGHT: 224.6 LBS

## 2025-08-26 DIAGNOSIS — H66.003 NON-RECURRENT ACUTE SUPPURATIVE OTITIS MEDIA OF BOTH EARS WITHOUT SPONTANEOUS RUPTURE OF TYMPANIC MEMBRANES: Primary | ICD-10-CM

## 2025-08-26 PROCEDURE — 1036F TOBACCO NON-USER: CPT | Performed by: NURSE PRACTITIONER

## 2025-08-26 PROCEDURE — G8427 DOCREV CUR MEDS BY ELIG CLIN: HCPCS | Performed by: NURSE PRACTITIONER

## 2025-08-26 PROCEDURE — G8417 CALC BMI ABV UP PARAM F/U: HCPCS | Performed by: NURSE PRACTITIONER

## 2025-08-26 PROCEDURE — 99213 OFFICE O/P EST LOW 20 MIN: CPT | Performed by: NURSE PRACTITIONER
